# Patient Record
Sex: MALE | Race: WHITE | NOT HISPANIC OR LATINO | Employment: FULL TIME | URBAN - METROPOLITAN AREA
[De-identification: names, ages, dates, MRNs, and addresses within clinical notes are randomized per-mention and may not be internally consistent; named-entity substitution may affect disease eponyms.]

---

## 2017-03-11 ENCOUNTER — COMMUNICATION - HEALTHEAST (OUTPATIENT)
Dept: FAMILY MEDICINE | Facility: CLINIC | Age: 62
End: 2017-03-11

## 2017-09-02 ENCOUNTER — COMMUNICATION - HEALTHEAST (OUTPATIENT)
Dept: FAMILY MEDICINE | Facility: CLINIC | Age: 62
End: 2017-09-02

## 2017-09-02 DIAGNOSIS — I10 HTN (HYPERTENSION): ICD-10-CM

## 2017-10-06 ENCOUNTER — OFFICE VISIT - HEALTHEAST (OUTPATIENT)
Dept: FAMILY MEDICINE | Facility: CLINIC | Age: 62
End: 2017-10-06

## 2017-10-06 DIAGNOSIS — F41.9 ANXIETY: ICD-10-CM

## 2017-10-06 DIAGNOSIS — I10 HYPERTENSION: ICD-10-CM

## 2017-10-06 LAB
CHOLEST SERPL-MCNC: 157 MG/DL
FASTING STATUS PATIENT QL REPORTED: YES
HDLC SERPL-MCNC: 42 MG/DL
LDLC SERPL CALC-MCNC: 84 MG/DL
PSA SERPL-MCNC: 0.8 NG/ML (ref 0–4.5)
TRIGL SERPL-MCNC: 156 MG/DL

## 2017-10-06 ASSESSMENT — MIFFLIN-ST. JEOR: SCORE: 1787.36

## 2017-10-09 ENCOUNTER — COMMUNICATION - HEALTHEAST (OUTPATIENT)
Dept: FAMILY MEDICINE | Facility: CLINIC | Age: 62
End: 2017-10-09

## 2017-10-09 DIAGNOSIS — F39 MOOD DISORDER (H): ICD-10-CM

## 2017-10-09 DIAGNOSIS — I10 ESSENTIAL HYPERTENSION: ICD-10-CM

## 2017-11-04 ENCOUNTER — COMMUNICATION - HEALTHEAST (OUTPATIENT)
Dept: FAMILY MEDICINE | Facility: CLINIC | Age: 62
End: 2017-11-04

## 2017-11-04 DIAGNOSIS — I10 HYPERTENSION: ICD-10-CM

## 2018-01-09 ENCOUNTER — COMMUNICATION - HEALTHEAST (OUTPATIENT)
Dept: FAMILY MEDICINE | Facility: CLINIC | Age: 63
End: 2018-01-09

## 2018-05-12 ENCOUNTER — COMMUNICATION - HEALTHEAST (OUTPATIENT)
Dept: FAMILY MEDICINE | Facility: CLINIC | Age: 63
End: 2018-05-12

## 2018-05-12 DIAGNOSIS — I10 HYPERTENSION: ICD-10-CM

## 2018-07-31 ENCOUNTER — COMMUNICATION - HEALTHEAST (OUTPATIENT)
Dept: FAMILY MEDICINE | Facility: CLINIC | Age: 63
End: 2018-07-31

## 2018-07-31 DIAGNOSIS — I10 HYPERTENSION: ICD-10-CM

## 2018-11-02 ENCOUNTER — OFFICE VISIT - HEALTHEAST (OUTPATIENT)
Dept: FAMILY MEDICINE | Facility: CLINIC | Age: 63
End: 2018-11-02

## 2018-11-02 DIAGNOSIS — F39 MOOD DISORDER (H): ICD-10-CM

## 2018-11-02 DIAGNOSIS — Z12.5 SCREENING FOR PROSTATE CANCER: ICD-10-CM

## 2018-11-02 DIAGNOSIS — L98.9 SKIN LESIONS: ICD-10-CM

## 2018-11-02 DIAGNOSIS — I10 ESSENTIAL HYPERTENSION: ICD-10-CM

## 2018-11-02 DIAGNOSIS — Z23 NEED FOR IMMUNIZATION AGAINST INFLUENZA: ICD-10-CM

## 2018-11-02 DIAGNOSIS — I10 HYPERTENSION: ICD-10-CM

## 2018-11-02 LAB
ANION GAP SERPL CALCULATED.3IONS-SCNC: 12 MMOL/L (ref 5–18)
BUN SERPL-MCNC: 16 MG/DL (ref 8–22)
CALCIUM SERPL-MCNC: 10.1 MG/DL (ref 8.5–10.5)
CHLORIDE BLD-SCNC: 98 MMOL/L (ref 98–107)
CHOLEST SERPL-MCNC: 135 MG/DL
CO2 SERPL-SCNC: 28 MMOL/L (ref 22–31)
CREAT SERPL-MCNC: 0.84 MG/DL (ref 0.7–1.3)
FASTING STATUS PATIENT QL REPORTED: YES
GFR SERPL CREATININE-BSD FRML MDRD: >60 ML/MIN/1.73M2
GLUCOSE BLD-MCNC: 88 MG/DL (ref 70–125)
HDLC SERPL-MCNC: 44 MG/DL
LDLC SERPL CALC-MCNC: 70 MG/DL
POTASSIUM BLD-SCNC: 4.7 MMOL/L (ref 3.5–5)
PSA SERPL-MCNC: 0.7 NG/ML (ref 0–4.5)
SODIUM SERPL-SCNC: 138 MMOL/L (ref 136–145)
TRIGL SERPL-MCNC: 106 MG/DL

## 2018-11-05 ENCOUNTER — COMMUNICATION - HEALTHEAST (OUTPATIENT)
Dept: FAMILY MEDICINE | Facility: CLINIC | Age: 63
End: 2018-11-05

## 2018-11-05 DIAGNOSIS — F39 MOOD DISORDER (H): ICD-10-CM

## 2018-11-05 DIAGNOSIS — I10 HYPERTENSION: ICD-10-CM

## 2018-11-05 DIAGNOSIS — I10 ESSENTIAL HYPERTENSION: ICD-10-CM

## 2019-11-01 ENCOUNTER — OFFICE VISIT - HEALTHEAST (OUTPATIENT)
Dept: FAMILY MEDICINE | Facility: CLINIC | Age: 64
End: 2019-11-01

## 2019-11-01 DIAGNOSIS — I10 HYPERTENSION: ICD-10-CM

## 2019-11-01 DIAGNOSIS — F41.9 ANXIETY: ICD-10-CM

## 2019-11-01 DIAGNOSIS — Z12.5 SCREENING FOR PROSTATE CANCER: ICD-10-CM

## 2019-11-01 DIAGNOSIS — I10 ESSENTIAL HYPERTENSION: ICD-10-CM

## 2019-11-01 DIAGNOSIS — F39 MOOD DISORDER (H): ICD-10-CM

## 2019-11-01 LAB
ANION GAP SERPL CALCULATED.3IONS-SCNC: 10 MMOL/L (ref 5–18)
BUN SERPL-MCNC: 16 MG/DL (ref 8–22)
CALCIUM SERPL-MCNC: 9.9 MG/DL (ref 8.5–10.5)
CHLORIDE BLD-SCNC: 101 MMOL/L (ref 98–107)
CO2 SERPL-SCNC: 29 MMOL/L (ref 22–31)
CREAT SERPL-MCNC: 0.81 MG/DL (ref 0.7–1.3)
GFR SERPL CREATININE-BSD FRML MDRD: >60 ML/MIN/1.73M2
GLUCOSE BLD-MCNC: 84 MG/DL (ref 70–125)
POTASSIUM BLD-SCNC: 3.8 MMOL/L (ref 3.5–5)
PSA SERPL-MCNC: 0.8 NG/ML (ref 0–4.5)
SODIUM SERPL-SCNC: 140 MMOL/L (ref 136–145)

## 2019-11-01 ASSESSMENT — PATIENT HEALTH QUESTIONNAIRE - PHQ9: SUM OF ALL RESPONSES TO PHQ QUESTIONS 1-9: 2

## 2019-11-01 ASSESSMENT — ANXIETY QUESTIONNAIRES
3. WORRYING TOO MUCH ABOUT DIFFERENT THINGS: NOT AT ALL
5. BEING SO RESTLESS THAT IT IS HARD TO SIT STILL: NOT AT ALL
IF YOU CHECKED OFF ANY PROBLEMS ON THIS QUESTIONNAIRE, HOW DIFFICULT HAVE THESE PROBLEMS MADE IT FOR YOU TO DO YOUR WORK, TAKE CARE OF THINGS AT HOME, OR GET ALONG WITH OTHER PEOPLE: NOT DIFFICULT AT ALL
GAD7 TOTAL SCORE: 1
1. FEELING NERVOUS, ANXIOUS, OR ON EDGE: NOT AT ALL
7. FEELING AFRAID AS IF SOMETHING AWFUL MIGHT HAPPEN: NOT AT ALL
4. TROUBLE RELAXING: NOT AT ALL
2. NOT BEING ABLE TO STOP OR CONTROL WORRYING: NOT AT ALL
6. BECOMING EASILY ANNOYED OR IRRITABLE: SEVERAL DAYS

## 2019-11-01 ASSESSMENT — MIFFLIN-ST. JEOR: SCORE: 1838.62

## 2020-09-28 ENCOUNTER — COMMUNICATION - HEALTHEAST (OUTPATIENT)
Dept: FAMILY MEDICINE | Facility: CLINIC | Age: 65
End: 2020-09-28

## 2020-09-28 DIAGNOSIS — F39 MOOD DISORDER (H): ICD-10-CM

## 2020-10-23 ENCOUNTER — OFFICE VISIT - HEALTHEAST (OUTPATIENT)
Dept: FAMILY MEDICINE | Facility: CLINIC | Age: 65
End: 2020-10-23

## 2020-10-23 DIAGNOSIS — Z12.5 SCREENING FOR PROSTATE CANCER: ICD-10-CM

## 2020-10-23 DIAGNOSIS — Z11.59 NEED FOR HEPATITIS C SCREENING TEST: ICD-10-CM

## 2020-10-23 DIAGNOSIS — I10 HYPERTENSION: ICD-10-CM

## 2020-10-23 DIAGNOSIS — F39 MOOD DISORDER (H): ICD-10-CM

## 2020-10-23 DIAGNOSIS — Z11.4 ENCOUNTER FOR SCREENING FOR HIV: ICD-10-CM

## 2020-10-23 DIAGNOSIS — I10 ESSENTIAL HYPERTENSION: ICD-10-CM

## 2020-10-23 LAB
ANION GAP SERPL CALCULATED.3IONS-SCNC: 13 MMOL/L (ref 5–18)
BUN SERPL-MCNC: 16 MG/DL (ref 8–22)
CALCIUM SERPL-MCNC: 10.1 MG/DL (ref 8.5–10.5)
CHLORIDE BLD-SCNC: 99 MMOL/L (ref 98–107)
CHOLEST SERPL-MCNC: 168 MG/DL
CO2 SERPL-SCNC: 29 MMOL/L (ref 22–31)
CREAT SERPL-MCNC: 0.83 MG/DL (ref 0.7–1.3)
ERYTHROCYTE [DISTWIDTH] IN BLOOD BY AUTOMATED COUNT: 11.4 % (ref 11–14.5)
FASTING STATUS PATIENT QL REPORTED: YES
GFR SERPL CREATININE-BSD FRML MDRD: >60 ML/MIN/1.73M2
GLUCOSE BLD-MCNC: 86 MG/DL (ref 70–125)
HCT VFR BLD AUTO: 50 % (ref 40–54)
HDLC SERPL-MCNC: 53 MG/DL
HGB BLD-MCNC: 17 G/DL (ref 14–18)
HIV 1+2 AB+HIV1 P24 AG SERPL QL IA: NEGATIVE
LDLC SERPL CALC-MCNC: 85 MG/DL
MCH RBC QN AUTO: 32.2 PG (ref 27–34)
MCHC RBC AUTO-ENTMCNC: 34 G/DL (ref 32–36)
MCV RBC AUTO: 95 FL (ref 80–100)
PLATELET # BLD AUTO: 180 THOU/UL (ref 140–440)
PMV BLD AUTO: 7 FL (ref 7–10)
POTASSIUM BLD-SCNC: 4.6 MMOL/L (ref 3.5–5)
PSA SERPL-MCNC: 0.9 NG/ML (ref 0–4.5)
RBC # BLD AUTO: 5.29 MILL/UL (ref 4.4–6.2)
SODIUM SERPL-SCNC: 141 MMOL/L (ref 136–145)
TRIGL SERPL-MCNC: 152 MG/DL
WBC: 7 THOU/UL (ref 4–11)

## 2020-10-23 RX ORDER — METOPROLOL SUCCINATE 25 MG/1
25 TABLET, EXTENDED RELEASE ORAL DAILY
Qty: 30 TABLET | Refills: 11 | Status: SHIPPED | OUTPATIENT
Start: 2020-10-23 | End: 2021-10-17

## 2020-10-23 RX ORDER — LISINOPRIL AND HYDROCHLOROTHIAZIDE 20; 25 MG/1; MG/1
1 TABLET ORAL DAILY
Qty: 30 TABLET | Refills: 11 | Status: SHIPPED | OUTPATIENT
Start: 2020-10-23 | End: 2021-10-17

## 2020-10-23 RX ORDER — SERTRALINE HYDROCHLORIDE 100 MG/1
TABLET, FILM COATED ORAL
Qty: 45 TABLET | Refills: 11 | Status: SHIPPED | OUTPATIENT
Start: 2020-10-23 | End: 2021-10-17

## 2020-10-23 ASSESSMENT — ANXIETY QUESTIONNAIRES
5. BEING SO RESTLESS THAT IT IS HARD TO SIT STILL: NOT AT ALL
7. FEELING AFRAID AS IF SOMETHING AWFUL MIGHT HAPPEN: NOT AT ALL
1. FEELING NERVOUS, ANXIOUS, OR ON EDGE: SEVERAL DAYS
6. BECOMING EASILY ANNOYED OR IRRITABLE: SEVERAL DAYS
6. BECOMING EASILY ANNOYED OR IRRITABLE: SEVERAL DAYS
2. NOT BEING ABLE TO STOP OR CONTROL WORRYING: NOT AT ALL
1. FEELING NERVOUS, ANXIOUS, OR ON EDGE: SEVERAL DAYS
GAD7 TOTAL SCORE: 2
2. NOT BEING ABLE TO STOP OR CONTROL WORRYING: NOT AT ALL
4. TROUBLE RELAXING: NOT AT ALL
3. WORRYING TOO MUCH ABOUT DIFFERENT THINGS: NOT AT ALL
5. BEING SO RESTLESS THAT IT IS HARD TO SIT STILL: NOT AT ALL
4. TROUBLE RELAXING: NOT AT ALL
GAD7 TOTAL SCORE: 2
IF YOU CHECKED OFF ANY PROBLEMS ON THIS QUESTIONNAIRE, HOW DIFFICULT HAVE THESE PROBLEMS MADE IT FOR YOU TO DO YOUR WORK, TAKE CARE OF THINGS AT HOME, OR GET ALONG WITH OTHER PEOPLE: NOT DIFFICULT AT ALL
7. FEELING AFRAID AS IF SOMETHING AWFUL MIGHT HAPPEN: NOT AT ALL
3. WORRYING TOO MUCH ABOUT DIFFERENT THINGS: NOT AT ALL

## 2020-10-23 ASSESSMENT — PATIENT HEALTH QUESTIONNAIRE - PHQ9: SUM OF ALL RESPONSES TO PHQ QUESTIONS 1-9: 2

## 2020-10-26 LAB — HCV AB SERPL QL IA: NEGATIVE

## 2021-05-26 ASSESSMENT — PATIENT HEALTH QUESTIONNAIRE - PHQ9
SUM OF ALL RESPONSES TO PHQ QUESTIONS 1-9: 2
SUM OF ALL RESPONSES TO PHQ QUESTIONS 1-9: 2

## 2021-05-28 ASSESSMENT — ANXIETY QUESTIONNAIRES
GAD7 TOTAL SCORE: 1
GAD7 TOTAL SCORE: 2

## 2021-05-31 VITALS — HEIGHT: 71 IN | WEIGHT: 215 LBS | BODY MASS INDEX: 30.1 KG/M2

## 2021-06-02 VITALS — BODY MASS INDEX: 30.93 KG/M2 | WEIGHT: 221.8 LBS

## 2021-06-02 NOTE — PATIENT INSTRUCTIONS - HE
You received the flu shot   I recommend that you consider the Shingles vaccine/shingrix  We will check your laboratory testing as discussed

## 2021-06-02 NOTE — PROGRESS NOTES
Assessment/ Plan     1. Essential hypertension  Currently stable but borderline control    Continue lisinopril-hydrochlorothiazide and metoprolol  Have reviewed side effects  Check a basic metabolic panel  Recommend increasing aerobic exercise  Monitor blood pressure  - Basic Metabolic Panel    - lisinopril-hydrochlorothiazide (PRINZIDE,ZESTORETIC) 20-25 mg per tablet; Take 1 tablet by mouth daily.  Dispense: 30 tablet; Refill: 11    - metoprolol succinate (TOPROL-XL) 25 MG; Take 1 tablet (25 mg total) by mouth daily.  Dispense: 30 tablet; Refill: 11      2. Anxiety  Mood disorder with associated depression  Currently stable    He is ELMER-7 score is 1  His PHQ-9 score is 2  Patient preference is to stay on sertraline 150 mg daily  He prefers not to consider weaning the medication at this time as he is doing so well    - sertraline (ZOLOFT) 100 MG tablet; TAKE 1 AND 1/2 TABLETS(150 MG) BY MOUTH DAILY  Dispense: 45 tablet; Refill: 11      3. Screening for prostate cancer    Check a PSA  - PSA (Prostatic-Specific Antigen), Annual Screen      Subjective:       Jaime Kaufman is a 64 y.o. male who presents to the clinic for a medication check.  His medical history is notable for hypertension as well as a mood disorder with associated depression and anxiety.  For hypertension he continues to take lisinopril-hydrocorothiazide and metoprolol.  He tolerates his medication without difficulty and his blood pressure is generally been stable.    He reports his mood is been quite stable as well.  He continues to take sertraline 150 mg daily.  He completed a ELMER-7 questionnaire with a total of one point.  He can be somewhat irritable but denies significant anxiety.  His mood has been stable and he denies depression symptoms.  He can have some sleep and concentration issues but this has not been significant.  His preference is to not wean off of sertraline.    Interim history is notable for the fact that he did have a tick bite  "that became infected.  Initially he was treated with cephalexin but then switched to doxycycline given the possibility of a Lyme infection.  He denies joint pain or flulike illness.    He tolerates physical exertion without difficulty.  Denies chest pain, shortness of breath, or palpitations.  He would like a flu vaccine.  He will consider the shingles vaccine.    His colonoscopy is up-to-date from 2011.  He is due in 2021.    The following portions of the patient's history were reviewed and updated as appropriate: allergies, current medications, past family history, past medical history, past social history, past surgical history and problem list. Medications have been reconciled    Review of Systems   A 12 point comprehensive review of systems was negative except as noted.      Current Outpatient Medications   Medication Sig Dispense Refill     lisinopril-hydrochlorothiazide (PRINZIDE,ZESTORETIC) 20-25 mg per tablet Take 1 tablet by mouth daily. 30 tablet 11     metoprolol succinate (TOPROL-XL) 25 MG Take 1 tablet (25 mg total) by mouth daily. 30 tablet 11     sertraline (ZOLOFT) 100 MG tablet TAKE 1 AND 1/2 TABLETS(150 MG) BY MOUTH DAILY 45 tablet 11     No current facility-administered medications for this visit.        Objective:      /82   Pulse 72   Resp 20   Ht 5' 11\" (1.803 m)   Wt (!) 226 lb 4.8 oz (102.6 kg)   BMI 31.56 kg/m        General appearance: alert, appears stated age and cooperative  Head: Normocephalic, without obvious abnormality, atraumatic  Eyes: conjunctivae/corneas clear. PERRL, EOM's intact.   Lungs: clear to auscultation bilaterally  Heart: regular rate and rhythm, S1, S2 normal, no murmur, click, rub or gallop  Extremities: extremities normal, atraumatic, no cyanosis or edema  Skin: Skin color, texture, turgor normal. No rashes or lesions  Lymph nodes: Cervical nodes normal.  Neurologic: Alert and oriented X 3         No results found for this or any previous visit (from the " past 168 hour(s)).       This note has been dictated using voice recognition software. Any grammatical or context distortions are unintentional and inherent to the software

## 2021-06-03 VITALS
HEIGHT: 71 IN | BODY MASS INDEX: 31.68 KG/M2 | SYSTOLIC BLOOD PRESSURE: 136 MMHG | DIASTOLIC BLOOD PRESSURE: 82 MMHG | WEIGHT: 226.3 LBS | RESPIRATION RATE: 20 BRPM | HEART RATE: 72 BPM

## 2021-06-05 VITALS
BODY MASS INDEX: 30.24 KG/M2 | HEART RATE: 93 BPM | TEMPERATURE: 98.6 F | DIASTOLIC BLOOD PRESSURE: 97 MMHG | WEIGHT: 216.8 LBS | SYSTOLIC BLOOD PRESSURE: 152 MMHG | RESPIRATION RATE: 16 BRPM

## 2021-06-12 NOTE — PROGRESS NOTES
Assessment/ Plan     1. Mood disorder (H)  Anxiety    PHQ-9 score is 2  ELMER-7 score is 2  Continue sertraline 150 mg daily  This medication has been effective for him and he prefers not to wean this medication.  He would like to continue the current dose    Check laboratory testing as noted  - sertraline (ZOLOFT) 100 MG tablet; TAKE ONE AND ONE-HALF TABLETS BY MOUTH ONCE DAILY  Dispense: 45 tablet; Refill: 11  - HM2(CBC w/o Differential)    2. Essential hypertension  3. Hypertension    Inadequate control.  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  Recommend increasing metoprolol if his blood pressure remains elevated  Reviewed potential side effects of medication and potential risks of elevated blood pressure    - lisinopriL-hydrochlorothiazide (PRINZIDE,ZESTORETIC) 20-25 mg per tablet; Take 1 tablet by mouth daily.  Dispense: 30 tablet; Refill: 11  - Basic Metabolic Panel  - Lipid Cascade    - metoprolol succinate (TOPROL-XL) 25 MG; Take 1 tablet (25 mg total) by mouth daily.  Dispense: 30 tablet; Refill: 11    4. Screening for prostate cancer    Check a PSA  - PSA (Prostatic-Specific Antigen), Annual Screen    5. Encounter for screening for HIV    Check an HIV test  - HIV Antigen/Antibody Screening [place]    6. Need for hepatitis C screening test    We will check a hepatitis C test  - Hepatitis C Antibody (Anti-HCV)      Subjective:       Jaime Kaufman is a 65 y.o. male who presents for a medication check.    His medical history is notable for a mood disorder associated with anxiety as well as hypertension.  He continues to take sertraline 150 mg daily.  He completed a ELMER-7 questionnaire times where he can worry about some things.  He can be irritable  at times as well.  His mood has generally been stable for denies significant depression symptoms.  He would like to continue sertraline 150 mg a day as this has been effective for him.  This has helped with some of his social anxiety.    For blood pressure he continues to take lisinopril-hydrochlorothiazide and Metroprolol and reports that his blood pressure was normal with his recent DOT physical examination of his blood pressure is normal today.  He has not had any symptoms of concern.  He denies headache, dizziness, chest pain with exertion, shortness of breath, or other concerns.    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  He has been able to stay healthy.  He denies other concerns at this time.    The following portions of the patient's history were reviewed and updated as appropriate: allergies, current medications, past family history, past medical history, past social history, past surgical history and problem list. Medications have been reconciled    Review of Systems   A 12 point comprehensive review of systems was negative except as noted.      Current Outpatient Medications   Medication Sig Dispense Refill     lisinopriL-hydrochlorothiazide (PRINZIDE,ZESTORETIC) 20-25 mg per tablet Take 1 tablet by mouth daily. 30 tablet 11     metoprolol succinate (TOPROL-XL) 25 MG Take 1 tablet (25 mg total) by mouth daily. 30 tablet 11     sertraline (ZOLOFT) 100 MG tablet TAKE ONE AND ONE-HALF TABLETS BY MOUTH ONCE DAILY 45 tablet 11     No current facility-administered medications for this visit.        Objective:      BP (!) 152/97   Pulse 93   Temp 98.6  F (37  C) (Oral)   Resp 16   Wt 216 lb 12.8 oz (98.3 kg)   BMI 30.24 kg/m      General appearance: alert, appears stated age   Head: normocephalic, without obvious abnormality, atraumatic  Eyes: conjunctivae/corneas clear. PERRL, EOM's intact.   Lungs: clear to auscultation bilaterally  Heart: regular  rate and rhythm, S1, S2 normal, no murmur, click, rub or gallop  Extremities: extremities normal, atraumatic, no cyanosis or edema  Skin: skin color, texture, turgor normal  Lymph nodes: Cervical nodes normal.  Neurologic: Alert and oriented X 3           No results found for this or any previous visit (from the past 168 hour(s)).       This note has been dictated using voice recognition software. Any grammatical or context distortions are unintentional and inherent to the software    Krishna Stout MD

## 2021-06-12 NOTE — PATIENT INSTRUCTIONS - HE
Continue current medications and monitor your blood pressure  Your goal blood pressure is less than 120/80  We will check laboratory testing as discussed  You may consider the Shingrix/shingles vaccine

## 2021-06-13 NOTE — PROGRESS NOTES
Assessment/ Plan     1. Hypertension    Continue metoprolol and lisinopril-hydrochlorthiazide  Reviewed potential side effects  Monitor blood pressure    Check laboratory testing  - Lipid Cascade  - Basic Metabolic Panel    2. Anxiety    A PHQ-9 score is 1  A ELMER-7 score is 1    Continue sertraline 150 mg daily  Review potential side effects  Patient does not have an interest or desire in weaning his medication at this time    Healthcare maintenance    Patient received influenza vaccine  Reviewed prostate cancer screening and discussed caveats  - PSA, Annual Screen (Prostatic-Specific Antigen)      Subjective:       Jaime Kaufman is a 62 y.o. male who presents to the clinic for a medication check.  He has known history of hypertension as well as mood disorder with depression and anxiety.  His medical history is notable for hypertension which has been well controlled with atenolol and lisinopril-hydrochlorothiazide.  He also has a history of an adjustment disorder with depressed mood as well as anxiety.  At the last visit he reported that his symptoms can wax and wane.  He noted that he sometimes will have little interest or pleasure in doing things and will feel down and depressed.    In the past he has been more anxious and irritable.  He now feels more stable on his medication.  He continues take sertraline 150 mg a day.  He sometimes feels tired but in general feels that his mood has been very good.  He has not had significant anxiety and has not been worrying.  He does become easily easily annoyed at times.  His last total cholesterol is 147 with an LDL of 74.  His review of systems negative for headache, dizziness, chest pain, palpitations, or other specific concerns.    The following portions of the patient's history were reviewed and updated as appropriate: allergies, current medications, past family history, past medical history, past social history, past surgical history and problem list.    Review of  "Systems   A 12 point comprehensive review of systems was negative except as noted.      Current Outpatient Prescriptions   Medication Sig Dispense Refill     metoprolol succinate (TOPROL XL) 25 MG Take 1 tablet (25 mg total) by mouth daily. 30 tablet 2     lisinopril-hydrochlorothiazide (PRINZIDE,ZESTORETIC) 20-25 mg per tablet Take 1 tablet by mouth daily. 90 tablet 3     sertraline (ZOLOFT) 100 MG tablet Take 1.5 tablets (150 mg total) by mouth daily. 135 tablet 3     No current facility-administered medications for this visit.        Objective:      /80  Pulse 80  Temp 97.5  F (36.4  C) (Oral)   Resp 16  Ht 5' 11\" (1.803 m)  Wt 215 lb (97.5 kg)  BMI 29.99 kg/m2      General appearance: alert, appears stated age and cooperative  Head: Normocephalic, without obvious abnormality, atraumatic  Eyes: conjunctivae/corneas clear. PERRL, EOM's intact. Neck: no adenopathy  Lungs: clear to auscultation bilaterally  Heart: regular rate and rhythm, S1, S2 normal, no murmur, click, rub or gallop  Lymph nodes: Cervical nodes normal.  Extremities without cyanosis or edema  Neurologic: Alert and oriented X 3. Normal coordination and gait         No results found for this or any previous visit (from the past 168 hour(s)).       This note has been dictated using voice recognition software. Any grammatical or context distortions are unintentional and inherent to the software  "

## 2021-06-21 NOTE — PROGRESS NOTES
Assessment/ Plan     1. Hypertension, well controlled    Continue metoprolol 25 mg daily  Continue lisinopril and hydrochlorothiazide 20/25 mg daily  Review potential side effects  Check a basic metabolic panel  - Basic Metabolic Panel  Recommend that he remain physically active and work on diet    2. Mood disorder (H)  Symptoms of anxiety and depression  Currently stable    The PHQ-9 score is 2  A ELMER-7 score is 2  Continue sertraline 150 mg daily  Patient has tolerated this medication and has no desire to wean this medication    3. Need for immunization against influenza  Influenza vaccine was given  - Influenza, Recombinant, Inj, Quadrivalent, PF, 18+YRS    5. Screening for prostate cancer  Check a PSA  Discussed the caveats of testing    6.  Skin lesions, face and forehead  He likely has an actinic keratosis in the left forehead    Recommend follow-up with dermatology for a full skin check    Healthcare maintenance    Recommend the Shingrix/shingles vaccine  His colonoscopy is up-to-date from August 2011.  He is due in August of 2021  - PSA (Prostatic-Specific Antigen), Annual Screen      Subjective:       Jaime Kaufman is a 63 y.o. male who presents to the clinic for a medication check.  He has a history of a mood disorder associated with depression and anxiety as well as hypertension.  He continues to take sertraline 150 mg a day.  This medication has worked quite well for him and he has no desire to wean his medication at this time.  He did complete a ELMER-7 questionnaire and scored a total of 2 points.  He notes that he sometimes will have some difficulty with relaxing and can be restless at times.  His mood has generally been good.  He denies any feelings of depression or hopelessness.  He denies loss of interest or pleasure in doing things.  He sometimes has some difficulty with sleep.  He would like to continue sertraline at the current dose.    Additionally, he has a history of hypertension which has  been stable.  He continues to take metoprolol as well as lisinopril-hydrochlorothiazide.  He denies any significant side effects including lightheadedness and cough.  He did have laboratory testing one year ago.  At that time his total cholesterol is 157 with triglycerides of 156.  His LDL was 84.    He is here for laboratory testing.  Review of systems is generally unremarkable.  He denies concerns with dizziness, chest pain, shortness of breath with exertion, bowel changes, or any urinary concerns.    He is up-to-date from his colonoscopy which was done in August 2011.  This showed diverticulosis but was otherwise normal.    He does have one concern regarding his skin lesion in the left temple area.  There is a brownish lesion with some discoloration surrounding this.  He has had a lot of sun exposure over time as he does a lot of outdoor activities such as fishing.    The following portions of the patient's history were reviewed and updated as appropriate: allergies, current medications, past family history, past medical history, past social history, past surgical history and problem list.    Review of Systems   A 12 point comprehensive review of systems was negative except as noted.      Current Outpatient Prescriptions   Medication Sig Dispense Refill     lisinopril-hydrochlorothiazide (PRINZIDE,ZESTORETIC) 20-25 mg per tablet Take 1 tablet by mouth daily. 90 tablet 3     metoprolol succinate (TOPROL-XL) 25 MG TAKE 1 TABLET(25 MG) BY MOUTH DAILY 90 tablet 0     sertraline (ZOLOFT) 100 MG tablet Take 1.5 tablets (150 mg total) by mouth daily. 135 tablet 3     No current facility-administered medications for this visit.        Objective:      /62  Pulse 76  Wt 221 lb 12.8 oz (100.6 kg)  BMI 30.93 kg/m2      General appearance: alert, appears stated age and cooperative  Head: Normocephalic, without obvious abnormality, atraumatic  Eyes: conjunctivae/corneas clear. PERRL, EOM's intact.   Nose: Nares  normal. Septum midline. Mucosa normal. No drainage or sinus tenderness.  Throat: lips, mucosa, and tongue normal; teeth and gums normal  Neck: no adenopathy, supple, symmetrical, trachea midline   Back: symmetric, no curvature. ROM normal. No CVA tenderness.  Lungs: clear to auscultation bilaterally  Heart: regular rate and rhythm, S1, S2 normal, no murmur, click, rub or gallop  Extremities: extremities normal, atraumatic, no cyanosis or edema  Skin: Examination of the forehead and face reveals multiple mildly erythematous maculopapular lesions  There is a left temple lesion is brownish in coloration with some surrounding erythema and increased vasculature noted.  There is a texture change as well  Lymph nodes: Cervical nodes normal.  Neurologic: Alert and oriented X 3.         No results found for this or any previous visit (from the past 168 hour(s)).       This note has been dictated using voice recognition software. Any grammatical or context distortions are unintentional and inherent to the software

## 2021-06-27 ENCOUNTER — HEALTH MAINTENANCE LETTER (OUTPATIENT)
Age: 66
End: 2021-06-27

## 2021-10-16 ENCOUNTER — HEALTH MAINTENANCE LETTER (OUTPATIENT)
Age: 66
End: 2021-10-16

## 2021-11-29 ENCOUNTER — OFFICE VISIT (OUTPATIENT)
Dept: FAMILY MEDICINE | Facility: CLINIC | Age: 66
End: 2021-11-29
Payer: COMMERCIAL

## 2021-11-29 VITALS
HEART RATE: 75 BPM | DIASTOLIC BLOOD PRESSURE: 85 MMHG | HEIGHT: 71 IN | WEIGHT: 221.6 LBS | RESPIRATION RATE: 16 BRPM | BODY MASS INDEX: 31.02 KG/M2 | SYSTOLIC BLOOD PRESSURE: 154 MMHG | TEMPERATURE: 98.9 F

## 2021-11-29 DIAGNOSIS — F39 MOOD DISORDER (H): ICD-10-CM

## 2021-11-29 DIAGNOSIS — I10 ESSENTIAL HYPERTENSION: ICD-10-CM

## 2021-11-29 LAB
ANION GAP SERPL CALCULATED.3IONS-SCNC: 10 MMOL/L (ref 5–18)
BUN SERPL-MCNC: 19 MG/DL (ref 8–22)
CALCIUM SERPL-MCNC: 10.2 MG/DL (ref 8.5–10.5)
CHLORIDE BLD-SCNC: 99 MMOL/L (ref 98–107)
CHOLEST SERPL-MCNC: 184 MG/DL
CO2 SERPL-SCNC: 32 MMOL/L (ref 22–31)
CREAT SERPL-MCNC: 0.83 MG/DL (ref 0.7–1.3)
FASTING STATUS PATIENT QL REPORTED: YES
GFR SERPL CREATININE-BSD FRML MDRD: >90 ML/MIN/1.73M2
GLUCOSE BLD-MCNC: 86 MG/DL (ref 70–125)
HDLC SERPL-MCNC: 61 MG/DL
LDLC SERPL CALC-MCNC: 101 MG/DL
POTASSIUM BLD-SCNC: 4.6 MMOL/L (ref 3.5–5)
SODIUM SERPL-SCNC: 141 MMOL/L (ref 136–145)
TRIGL SERPL-MCNC: 110 MG/DL

## 2021-11-29 PROCEDURE — 80061 LIPID PANEL: CPT | Performed by: FAMILY MEDICINE

## 2021-11-29 PROCEDURE — 36415 COLL VENOUS BLD VENIPUNCTURE: CPT | Performed by: FAMILY MEDICINE

## 2021-11-29 PROCEDURE — 90471 IMMUNIZATION ADMIN: CPT | Performed by: FAMILY MEDICINE

## 2021-11-29 PROCEDURE — 0004A COVID-19,PF,PFIZER (12+ YRS): CPT | Performed by: FAMILY MEDICINE

## 2021-11-29 PROCEDURE — 91300 COVID-19,PF,PFIZER (12+ YRS): CPT | Performed by: FAMILY MEDICINE

## 2021-11-29 PROCEDURE — 90682 RIV4 VACC RECOMBINANT DNA IM: CPT | Performed by: FAMILY MEDICINE

## 2021-11-29 PROCEDURE — 99214 OFFICE O/P EST MOD 30 MIN: CPT | Mod: 25 | Performed by: FAMILY MEDICINE

## 2021-11-29 PROCEDURE — 80048 BASIC METABOLIC PNL TOTAL CA: CPT | Performed by: FAMILY MEDICINE

## 2021-11-29 RX ORDER — METOPROLOL SUCCINATE 50 MG/1
50 TABLET, EXTENDED RELEASE ORAL DAILY
Qty: 30 TABLET | Refills: 11 | Status: SHIPPED | OUTPATIENT
Start: 2021-11-29 | End: 2022-11-07

## 2021-11-29 RX ORDER — METOPROLOL SUCCINATE 25 MG/1
25 TABLET, EXTENDED RELEASE ORAL DAILY
Qty: 30 TABLET | Refills: 1 | Status: CANCELLED | OUTPATIENT
Start: 2021-11-29

## 2021-11-29 RX ORDER — SERTRALINE HYDROCHLORIDE 100 MG/1
TABLET, FILM COATED ORAL
Qty: 30 TABLET | Refills: 11 | Status: SHIPPED | OUTPATIENT
Start: 2021-11-29 | End: 2022-11-30

## 2021-11-29 RX ORDER — LISINOPRIL AND HYDROCHLOROTHIAZIDE 20; 25 MG/1; MG/1
1 TABLET ORAL DAILY
Qty: 30 TABLET | Refills: 11 | Status: SHIPPED | OUTPATIENT
Start: 2021-11-29 | End: 2022-11-07

## 2021-11-29 ASSESSMENT — MIFFLIN-ST. JEOR: SCORE: 1817.3

## 2021-11-29 ASSESSMENT — ANXIETY QUESTIONNAIRES
1. FEELING NERVOUS, ANXIOUS, OR ON EDGE: SEVERAL DAYS
IF YOU CHECKED OFF ANY PROBLEMS ON THIS QUESTIONNAIRE, HOW DIFFICULT HAVE THESE PROBLEMS MADE IT FOR YOU TO DO YOUR WORK, TAKE CARE OF THINGS AT HOME, OR GET ALONG WITH OTHER PEOPLE: NOT DIFFICULT AT ALL
2. NOT BEING ABLE TO STOP OR CONTROL WORRYING: NOT AT ALL
7. FEELING AFRAID AS IF SOMETHING AWFUL MIGHT HAPPEN: NOT AT ALL
GAD7 TOTAL SCORE: 2
5. BEING SO RESTLESS THAT IT IS HARD TO SIT STILL: NOT AT ALL
6. BECOMING EASILY ANNOYED OR IRRITABLE: NOT AT ALL
3. WORRYING TOO MUCH ABOUT DIFFERENT THINGS: SEVERAL DAYS

## 2021-11-29 ASSESSMENT — PATIENT HEALTH QUESTIONNAIRE - PHQ9: 5. POOR APPETITE OR OVEREATING: NOT AT ALL

## 2021-11-29 NOTE — PATIENT INSTRUCTIONS
Artemio,    It was great to see you  Increase metoprolol to 50 mg a day as we discussed  Continue lisinopril-hydrochlorothiazide 20--25 mg daily  Continue to monitor your blood pressure.    You received the Covid-19 and flu shots today  We will check laboratory testing including your cholesterol and kidney profile with the blood sugar check  Please let me know if you want to do the colonoscopy starting in January  We can send a 90-day supply if your insurance will cover it as well    Krishna Stout MD

## 2021-11-30 ASSESSMENT — ANXIETY QUESTIONNAIRES: GAD7 TOTAL SCORE: 2

## 2021-11-30 ASSESSMENT — PATIENT HEALTH QUESTIONNAIRE - PHQ9: SUM OF ALL RESPONSES TO PHQ QUESTIONS 1-9: 4

## 2021-11-30 NOTE — PROGRESS NOTES
Assessment/ Plan     1. Essential hypertension  Inadequate control    Recommend increasing metoprolol to 50 mg daily  Continue lisinopril-hydrochlorothiazide 20-25 mg daily  Reviewed potential side effects  Check laboratory testing  Recommend working on dietary lifestyle changes  Follow to recheck blood pressure  Consider further adjustments as needed    - lisinopril-hydrochlorothiazide (ZESTORETIC) 20-25 MG tablet; Take 1 tablet by mouth daily  Dispense: 30 tablet; Refill: 11  - Lipid panel reflex to direct LDL Fasting; Future  - Basic metabolic panel; Future  - metoprolol succinate ER (TOPROL-XL) 50 MG 24 hr tablet; Take 1 tablet (50 mg) by mouth daily  Dispense: 30 tablet; Refill: 11  - Lipid panel reflex to direct LDL Fasting  - Basic metabolic panel    2. Mood disorder (H)  Currently stable    ELMER-7 score is 2  PHQ-9 score is 3  He has weaned sertraline to 100 mg daily  Continue with sertraline and follow-up as needed    - sertraline (ZOLOFT) 100 MG tablet; TAKE 1 PO Qday  Dispense: 30 tablet; Refill: 11    Healthcare maintenance    Vaccines given today include the Covid-19 booster and the influenza vaccine  His colonoscopy is due.  He prefers to wait until after January 1 when his insurance will change  Encourage a complete physical examination at that time    Spent 30 minutes including time for chart preparation as well as time with patient and reviewing medications and reviewing plan      Subjective:      Jaime Kaufman is a 66 year old male who presents for a medication check.  He has a known history of hypertension as well as a mood disorder associated with significant anxiety.    His medical history is notable for a mood disorder associated with anxiety as well as hypertension.  He continues to take sertraline and decided to wean that to 100 mg a day.  He completed a ELMER-7 questionnaire times where he can worry about some things.  He can be irritable at times as well.  His mood has generally been  "stable. He denies significant depression symptoms.       For blood pressure he continues to take lisinopril-hydrochlorothiazide and Metroprolol. His blood pressure is elevated today.  He has not had any symptoms of concern.  He denies headache, dizziness, chest pain with exertion, shortness of breath, or other concerns.     He continues to work for the [employer] at a [employer] and now is coordinating staffing for buses.    His last cholesterol is 168 with triglycerides of 152 and LDL of 85.    The following portions of the patient's history were reviewed and updated as appropriate: allergies, current medications, past family history, past medical history, past social history, past surgical history and problem list. Medications have been reconciled    Review of Systems   A 12 point comprehensive review of systems was negative except as noted.      Current Outpatient Medications   Medication Sig Dispense Refill     lisinopril-hydrochlorothiazide (ZESTORETIC) 20-25 MG tablet Take 1 tablet by mouth daily 30 tablet 11     metoprolol succinate ER (TOPROL-XL) 25 MG 24 hr tablet TAKE 1 TABLET(25 MG) BY MOUTH DAILY 30 tablet 1     metoprolol succinate ER (TOPROL-XL) 50 MG 24 hr tablet Take 1 tablet (50 mg) by mouth daily 30 tablet 11     sertraline (ZOLOFT) 100 MG tablet TAKE 1 PO Qday 30 tablet 11       Objective:     BP (!) 154/85 (BP Location: Left arm, Patient Position: Sitting, Cuff Size: Adult Regular)   Pulse 75   Temp 98.9  F (37.2  C) (Oral)   Resp 16   Ht 1.803 m (5' 11\")   Wt 100.5 kg (221 lb 9.6 oz)   BMI 30.91 kg/m      General appearance: alert, appears stated age   Head: normocephalic, without obvious abnormality, atraumatic  Eyes: conjunctivae/corneas clear. PERRL, EOM's intact.   Lungs: clear to auscultation bilaterally  Heart: regular rate and rhythm, S1, S2 normal, no murmur, click, rub or gallop  Abdomen: soft, non-tender; no masses,  no organomegaly  Extremities: extremities normal, atraumatic, " no cyanosis or edema  Skin: skin color, texture, turgor normal  Lymph nodes: Cervical nodes normal.  Neurologic: Alert and oriented X 3           Recent Results (from the past 168 hour(s))   Lipid panel reflex to direct LDL Fasting   Result Value Ref Range    Cholesterol 184 <=199 mg/dL    Triglycerides 110 <=149 mg/dL    Direct Measure HDL 61 >=40 mg/dL    LDL Cholesterol Calculated 101 <=129 mg/dL    Patient Fasting > 8hrs? Yes    Basic metabolic panel   Result Value Ref Range    Sodium 141 136 - 145 mmol/L    Potassium 4.6 3.5 - 5.0 mmol/L    Chloride 99 98 - 107 mmol/L    Carbon Dioxide (CO2) 32 (H) 22 - 31 mmol/L    Anion Gap 10 5 - 18 mmol/L    Urea Nitrogen 19 8 - 22 mg/dL    Creatinine 0.83 0.70 - 1.30 mg/dL    Calcium 10.2 8.5 - 10.5 mg/dL    Glucose 86 70 - 125 mg/dL    GFR Estimate >90 >60 mL/min/1.73m2          This note has been dictated using voice recognition software. Any grammatical or context distortions are unintentional and inherent to the software    Krishna Stout MD  Answers for HPI/ROS submitted by the patient on 11/29/2021  How many servings of fruits and vegetables do you eat daily?: 2-3  On average, how many sweetened beverages do you drink each day (Examples: soda, juice, sweet tea, etc.  Do NOT count diet or artificially sweetened beverages)?: 0  How many minutes a day do you exercise enough to make your heart beat faster?: 9 or less  How many days a week do you exercise enough to make your heart beat faster?: 4  How many days per week do you miss taking your medication?: 0

## 2022-07-23 ENCOUNTER — HEALTH MAINTENANCE LETTER (OUTPATIENT)
Age: 67
End: 2022-07-23

## 2022-10-01 ENCOUNTER — HEALTH MAINTENANCE LETTER (OUTPATIENT)
Age: 67
End: 2022-10-01

## 2022-10-26 NOTE — TELEPHONE ENCOUNTER
Refill Approved    Rx renewed per Medication Renewal Policy. Medication was last renewed on 11/1/19.    Yeni Quezada, [facility] Connection Triage/Med Refill 10/2/2020     Requested Prescriptions   Pending Prescriptions Disp Refills     sertraline (ZOLOFT) 100 MG tablet [Pharmacy Med Name: SERTRALINE 100MG TABLETS] 45 tablet 11     Sig: TAKE ONE AND ONE-HALF TABLETS BY MOUTH ONCE DAILY       SSRI Refill Protocol  Passed - 9/28/2020  3:35 PM        Passed - PCP or prescribing provider visit in last year     Last office visit with prescriber/PCP: 11/1/2019 Krishna Stout MD OR same dept: 11/1/2019 Krishna Stout MD OR same specialty: 11/1/2019 Krishna Stout MD  Last physical: Visit date not found Last MTM visit: Visit date not found   Next visit within 3 mo: Visit date not found  Next physical within 3 mo: Visit date not found  Prescriber OR PCP: Krishna Stout MD  Last diagnosis associated with med order: 1. Mood disorder (H)  - sertraline (ZOLOFT) 100 MG tablet [Pharmacy Med Name: SERTRALINE 100MG TABLETS]; TAKE ONE AND ONE-HALF TABLETS BY MOUTH ONCE DAILY  Dispense: 45 tablet; Refill: 11    If protocol passes may refill for 12 months if within 3 months of last provider visit (or a total of 15 months).                            
No

## 2022-11-07 ENCOUNTER — OFFICE VISIT (OUTPATIENT)
Dept: FAMILY MEDICINE | Facility: CLINIC | Age: 67
End: 2022-11-07
Payer: COMMERCIAL

## 2022-11-07 DIAGNOSIS — F39 MOOD DISORDER (H): ICD-10-CM

## 2022-11-07 DIAGNOSIS — Z12.11 SCREEN FOR COLON CANCER: ICD-10-CM

## 2022-11-07 DIAGNOSIS — Z12.5 SCREENING FOR PROSTATE CANCER: ICD-10-CM

## 2022-11-07 DIAGNOSIS — I10 ESSENTIAL HYPERTENSION: Primary | ICD-10-CM

## 2022-11-07 LAB
ANION GAP SERPL CALCULATED.3IONS-SCNC: 10 MMOL/L (ref 7–15)
BUN SERPL-MCNC: 12.2 MG/DL (ref 8–23)
CALCIUM SERPL-MCNC: 9.4 MG/DL (ref 8.8–10.2)
CHLORIDE SERPL-SCNC: 98 MMOL/L (ref 98–107)
CHOLEST SERPL-MCNC: 153 MG/DL
CREAT SERPL-MCNC: 1.09 MG/DL (ref 0.67–1.17)
DEPRECATED HCO3 PLAS-SCNC: 30 MMOL/L (ref 22–29)
GFR SERPL CREATININE-BSD FRML MDRD: 75 ML/MIN/1.73M2
GLUCOSE SERPL-MCNC: 94 MG/DL (ref 70–99)
HDLC SERPL-MCNC: 40 MG/DL
LDLC SERPL CALC-MCNC: 86 MG/DL
NONHDLC SERPL-MCNC: 113 MG/DL
POTASSIUM SERPL-SCNC: 5 MMOL/L (ref 3.4–5.3)
PSA SERPL-MCNC: 0.57 NG/ML (ref 0–4.5)
SODIUM SERPL-SCNC: 138 MMOL/L (ref 136–145)
TRIGL SERPL-MCNC: 137 MG/DL

## 2022-11-07 PROCEDURE — G0103 PSA SCREENING: HCPCS | Performed by: FAMILY MEDICINE

## 2022-11-07 PROCEDURE — 90677 PCV20 VACCINE IM: CPT | Performed by: FAMILY MEDICINE

## 2022-11-07 PROCEDURE — 36415 COLL VENOUS BLD VENIPUNCTURE: CPT | Performed by: FAMILY MEDICINE

## 2022-11-07 PROCEDURE — 80048 BASIC METABOLIC PNL TOTAL CA: CPT | Performed by: FAMILY MEDICINE

## 2022-11-07 PROCEDURE — 91312 COVID-19,PF,PFIZER BOOSTER BIVALENT: CPT | Performed by: FAMILY MEDICINE

## 2022-11-07 PROCEDURE — 99214 OFFICE O/P EST MOD 30 MIN: CPT | Mod: 25 | Performed by: FAMILY MEDICINE

## 2022-11-07 PROCEDURE — 0124A COVID-19,PF,PFIZER BOOSTER BIVALENT: CPT | Performed by: FAMILY MEDICINE

## 2022-11-07 PROCEDURE — G0009 ADMIN PNEUMOCOCCAL VACCINE: HCPCS | Performed by: FAMILY MEDICINE

## 2022-11-07 PROCEDURE — 80061 LIPID PANEL: CPT | Performed by: FAMILY MEDICINE

## 2022-11-07 RX ORDER — METOPROLOL SUCCINATE 50 MG/1
50 TABLET, EXTENDED RELEASE ORAL DAILY
Qty: 90 TABLET | Refills: 3
Start: 2022-11-07 | End: 2022-11-30

## 2022-11-07 RX ORDER — LISINOPRIL AND HYDROCHLOROTHIAZIDE 20; 25 MG/1; MG/1
1 TABLET ORAL DAILY
Qty: 90 TABLET | Refills: 3
Start: 2022-11-07 | End: 2022-11-30

## 2022-11-07 NOTE — PROGRESS NOTES
Assessment & Plan     Essential hypertension    Blood pressure is elevated here though has been much better when at home and elsewhere  Continue Metroprolol and lisinopril-hydrochlorothiazide    - Basic metabolic panel  - Lipid panel reflex to direct LDL Fasting  - lisinopril-hydrochlorothiazide (ZESTORETIC) 20-25 MG tablet  Dispense: 90 tablet; Refill: 3  - metoprolol succinate ER (TOPROL XL) 50 MG 24 hr tablet  Dispense: 90 tablet; Refill: 3  - Basic metabolic panel  - Lipid panel reflex to direct LDL Fasting    Mood disorder (H)    Currently stable  Continue sertraline  Reviewed a possible weaning protocol    Screen for colon cancer    Recommended colonoscopy    Screening for prostate cancer    Check a PSA    - PSA, screen  - PSA, screen    Former tobacco use    Recommend that he consider ultrasound of the abdomen to rule out an abdominal aortic aneurysm in the future      Review of external notes as documented elsewhere in note      No follow-ups on file.    Krishna Stout MD  [facility]    Ellen Ulloa is a 67 year old, presenting for the following health issues:  Recheck Medication    This is a pleasant 67-year-old male who presents to the clinic for a medication check.    He has a known history of hypertension as well as a mood disorder associated with significant anxiety.    He continues to take Metroprolol and lisinopril-hydrochlorothiazide for hypertension.  He has had multiple blood pressure readings elsewhere and his readings have been much better.     His medical history is notable for a mood disorder associated with anxiety as well as hypertension.  He continues to take sertraline and would like to consider weaning that.  He completed a ELMER-7 questionnaire. There have beentimes where he can worry about some things.  He can be irritable at times as well.  His mood has generally been stable. He denies significant depression symptoms. He is feeling significantly  better  and less stressed since changing his job.     He denies chest pain or shortness of breath.  He is a former tobacco user.    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       History of Present Illness       Hypertension: He presents for follow up of hypertension.  He does check blood pressure  regularly outside of the clinic. Outpatient blood pressures have not been over 140/90. He does not follow a low salt diet.               Review of Systems         Objective    BP (!) 149/81 (BP Location: Left arm, Patient Position: Sitting, Cuff Size: Adult Regular)   Pulse 57   Resp 16   Wt 98.6 kg (217 lb 6.4 oz)   BMI 30.32 kg/m    Body mass index is 30.32 kg/m .  Physical Exam   GENERAL: healthy, alert and no distress  EYES: Eyes grossly normal to inspection, PERRL and conjunctivae and sclerae normal  RESP: lungs clear to auscultation - no rales, rhonchi or wheezes  CV: regular rate and rhythm, normal S1 S2, no S3 or S4, no murmur, click or rub, no peripheral edema and peripheral pulses strong  MS: no gross musculoskeletal defects noted, no edema  NEURO: Normal strength and tone, mentation intact and speech normal  PSYCH: mentation appears normal, affect normal/bright

## 2022-11-07 NOTE — PATIENT INSTRUCTIONS
Artemio,    Your blood pressure is high here today but has been better when you have checked it elsewhere  Continue to work on your dietary and lifestyle changes  Limit sodium in your diet  You may decrease sertraline to 50 mg a day  Please provide an update if you would like to stay at this dose  If needed you can bump sertraline up to 100 mg  Continue your current blood pressure medications  Set up your colonoscopy when you are ready  You received a COVID and pneumonia shot today  Have your shingles shot as planned  I recommend an ultrasound of the abdomen to rule out an abdominal aortic aneurysm in the future  We discussed also a low-dose cardiac calcium test given your history of tobacco use

## 2022-11-14 VITALS
RESPIRATION RATE: 16 BRPM | SYSTOLIC BLOOD PRESSURE: 128 MMHG | HEART RATE: 57 BPM | DIASTOLIC BLOOD PRESSURE: 72 MMHG | WEIGHT: 217.4 LBS | BODY MASS INDEX: 30.32 KG/M2

## 2022-11-29 DIAGNOSIS — I10 ESSENTIAL HYPERTENSION: ICD-10-CM

## 2022-11-29 DIAGNOSIS — F39 MOOD DISORDER (H): ICD-10-CM

## 2022-11-29 NOTE — TELEPHONE ENCOUNTER
Pharmacy sent this request on 11/28 stating last refill was 10/30/22 for Qty: 30  With 3 refills. Patient wants to ensure there will be more refills come December. Pharmacy needs new prescriptions sent in.

## 2022-11-30 RX ORDER — SERTRALINE HYDROCHLORIDE 100 MG/1
100 TABLET, FILM COATED ORAL DAILY
Qty: 90 TABLET | Refills: 3 | Status: SHIPPED | OUTPATIENT
Start: 2022-11-30 | End: 2023-11-20

## 2022-11-30 RX ORDER — METOPROLOL SUCCINATE 50 MG/1
50 TABLET, EXTENDED RELEASE ORAL DAILY
Qty: 90 TABLET | Refills: 3 | Status: SHIPPED | OUTPATIENT
Start: 2022-11-30 | End: 2023-11-20

## 2022-11-30 RX ORDER — LISINOPRIL AND HYDROCHLOROTHIAZIDE 20; 25 MG/1; MG/1
1 TABLET ORAL DAILY
Qty: 90 TABLET | Refills: 3 | Status: SHIPPED | OUTPATIENT
Start: 2022-11-30 | End: 2023-11-20

## 2022-11-30 NOTE — TELEPHONE ENCOUNTER
"Routing refill request to provider for review/approval because:  Due to medication information not transferring due to [facility] please review the medication information prior to signing to ensure accuracy.    Last Written Prescription Date:  11/29/21  Last Fill Quantity: 30,  # refills: 11   Last office visit provider:  11/7/22     Requested Prescriptions   Pending Prescriptions Disp Refills     sertraline (ZOLOFT) 100 MG tablet 90 tablet 3     Sig: TAKE 1 PO Qday       SSRIs Protocol Passed - 11/29/2022  9:25 AM        Passed - Recent (12 mo) or future (30 days) visit within the authorizing provider's specialty     Patient has had an office visit with the authorizing provider or a provider within the authorizing providers department within the previous 12 mos or has a future within next 30 days. See \"Patient Info\" tab in inbasket, or \"Choose Columns\" in Meds & Orders section of the refill encounter.              Passed - Medication is active on med list        Passed - Patient is age 18 or older         Signed Prescriptions Disp Refills    lisinopril-hydrochlorothiazide (ZESTORETIC) 20-25 MG tablet 90 tablet 3     Sig: Take 1 tablet by mouth daily       Diuretics (Including Combos) Protocol Passed - 11/30/2022 10:35 AM        Passed - Blood pressure under 140/90 in past 12 months     BP Readings from Last 3 Encounters:   11/14/22 128/72   11/29/21 (!) 154/85   10/23/20 (!) 152/97                 Passed - Recent (12 mo) or future (30 days) visit within the authorizing provider's specialty     Patient has had an office visit with the authorizing provider or a provider within the authorizing providers department within the previous 12 mos or has a future within next 30 days. See \"Patient Info\" tab in inbasket, or \"Choose Columns\" in Meds & Orders section of the refill encounter.              Passed - Medication is active on med list        Passed - Patient is age 18 or older        Passed - Normal serum creatinine on file " "in past 12 months     Recent Labs   Lab Test 11/07/22  1208   CR 1.09              Passed - Normal serum potassium on file in past 12 months     Recent Labs   Lab Test 11/07/22  1208   POTASSIUM 5.0                    Passed - Normal serum sodium on file in past 12 months     Recent Labs   Lab Test 11/07/22  1208                ACE Inhibitors (Including Combos) Protocol Passed - 11/30/2022 10:35 AM        Passed - Blood pressure under 140/90 in past 12 months     BP Readings from Last 3 Encounters:   11/14/22 128/72   11/29/21 (!) 154/85   10/23/20 (!) 152/97                 Passed - Recent (12 mo) or future (30 days) visit within the authorizing provider's specialty     Patient has had an office visit with the authorizing provider or a provider within the authorizing providers department within the previous 12 mos or has a future within next 30 days. See \"Patient Info\" tab in inbasket, or \"Choose Columns\" in Meds & Orders section of the refill encounter.              Passed - Medication is active on med list        Passed - Patient is age 18 or older        Passed - Normal serum creatinine on file in past 12 months     Recent Labs   Lab Test 11/07/22  1208   CR 1.09       Ok to refill medication if creatinine is low          Passed - Normal serum potassium on file in past 12 months     Recent Labs   Lab Test 11/07/22  1208   POTASSIUM 5.0               metoprolol succinate ER (TOPROL XL) 50 MG 24 hr tablet 90 tablet 3     Sig: Take 1 tablet (50 mg) by mouth daily       Beta-Blockers Protocol Passed - 11/29/2022  9:25 AM        Passed - Blood pressure under 140/90 in past 12 months     BP Readings from Last 3 Encounters:   11/14/22 128/72   11/29/21 (!) 154/85   10/23/20 (!) 152/97                 Passed - Patient is age 6 or older        Passed - Recent (12 mo) or future (30 days) visit within the authorizing provider's specialty     Patient has had an office visit with the authorizing provider or a provider " "within the authorizing providers department within the previous 12 mos or has a future within next 30 days. See \"Patient Info\" tab in inbasket, or \"Choose Columns\" in Meds & Orders section of the refill encounter.              Passed - Medication is active on med list             Krunal Castle RN 11/30/22 10:39 AM  "

## 2022-11-30 NOTE — TELEPHONE ENCOUNTER
"Outpatient Medication Detail     Disp Refills Start End FRANCESCO   lisinopril-hydrochlorothiazide (ZESTORETIC) 20-25 MG tablet 90 tablet 3 11/7/2022  No   Sig - Route: Take 1 tablet by mouth daily - Oral   Class: No Print Out   Order: [id number]     Outpatient Medication Detail     Disp Refills Start End FRANCESCO   metoprolol succinate ER (TOPROL XL) 50 MG 24 hr tablet 90 tablet 3 11/7/2022  No   Sig - Route: Take 1 tablet (50 mg) by mouth daily - Oral   Class: No Print Out   Order: [id number]     CLASS:  No print.  Sending to pharmacy per protocol.    Last Written Prescription Date:  11/29/21  Last Fill Quantity: 30,  # refills: 11   Last office visit provider:  11/7/22     Requested Prescriptions   Pending Prescriptions Disp Refills     lisinopril-hydrochlorothiazide (ZESTORETIC) 20-25 MG tablet 90 tablet 3     Sig: Take 1 tablet by mouth daily       Diuretics (Including Combos) Protocol Passed - 11/30/2022 10:35 AM        Passed - Blood pressure under 140/90 in past 12 months     BP Readings from Last 3 Encounters:   11/14/22 128/72   11/29/21 (!) 154/85   10/23/20 (!) 152/97                 Passed - Recent (12 mo) or future (30 days) visit within the authorizing provider's specialty     Patient has had an office visit with the authorizing provider or a provider within the authorizing providers department within the previous 12 mos or has a future within next 30 days. See \"Patient Info\" tab in inbasket, or \"Choose Columns\" in Meds & Orders section of the refill encounter.              Passed - Medication is active on med list        Passed - Patient is age 18 or older        Passed - Normal serum creatinine on file in past 12 months     Recent Labs   Lab Test 11/07/22  1208   CR 1.09              Passed - Normal serum potassium on file in past 12 months     Recent Labs   Lab Test 11/07/22  1208   POTASSIUM 5.0                    Passed - Normal serum sodium on file in past 12 months     Recent Labs   Lab Test 11/07/22  1208 " "               ACE Inhibitors (Including Combos) Protocol Passed - 11/30/2022 10:35 AM        Passed - Blood pressure under 140/90 in past 12 months     BP Readings from Last 3 Encounters:   11/14/22 128/72   11/29/21 (!) 154/85   10/23/20 (!) 152/97                 Passed - Recent (12 mo) or future (30 days) visit within the authorizing provider's specialty     Patient has had an office visit with the authorizing provider or a provider within the authorizing providers department within the previous 12 mos or has a future within next 30 days. See \"Patient Info\" tab in inbasket, or \"Choose Columns\" in Meds & Orders section of the refill encounter.              Passed - Medication is active on med list        Passed - Patient is age 18 or older        Passed - Normal serum creatinine on file in past 12 months     Recent Labs   Lab Test 11/07/22  1208   CR 1.09       Ok to refill medication if creatinine is low          Passed - Normal serum potassium on file in past 12 months     Recent Labs   Lab Test 11/07/22  1208   POTASSIUM 5.0                sertraline (ZOLOFT) 100 MG tablet 30 tablet 11     Sig: TAKE 1 PO Qday       SSRIs Protocol Passed - 11/29/2022  9:25 AM        Passed - Recent (12 mo) or future (30 days) visit within the authorizing provider's specialty     Patient has had an office visit with the authorizing provider or a provider within the authorizing providers department within the previous 12 mos or has a future within next 30 days. See \"Patient Info\" tab in inbasket, or \"Choose Columns\" in Meds & Orders section of the refill encounter.              Passed - Medication is active on med list        Passed - Patient is age 18 or older           metoprolol succinate ER (TOPROL XL) 50 MG 24 hr tablet 90 tablet 3     Sig: Take 1 tablet (50 mg) by mouth daily       Beta-Blockers Protocol Passed - 11/29/2022  9:25 AM        Passed - Blood pressure under 140/90 in past 12 months     BP Readings from " "Last 3 Encounters:   11/14/22 128/72   11/29/21 (!) 154/85   10/23/20 (!) 152/97                 Passed - Patient is age 6 or older        Passed - Recent (12 mo) or future (30 days) visit within the authorizing provider's specialty     Patient has had an office visit with the authorizing provider or a provider within the authorizing providers department within the previous 12 mos or has a future within next 30 days. See \"Patient Info\" tab in inbasket, or \"Choose Columns\" in Meds & Orders section of the refill encounter.              Passed - Medication is active on med list             Krunal Castle RN 11/30/22 10:37 AM  "

## 2023-07-12 ENCOUNTER — TRANSFERRED RECORDS (OUTPATIENT)
Dept: HEALTH INFORMATION MANAGEMENT | Facility: CLINIC | Age: 68
End: 2023-07-12
Payer: COMMERCIAL

## 2023-07-27 PROBLEM — D12.6 ADENOMATOUS COLON POLYP: Status: ACTIVE | Noted: 2023-07-27

## 2023-08-06 ENCOUNTER — HEALTH MAINTENANCE LETTER (OUTPATIENT)
Age: 68
End: 2023-08-06

## 2023-11-19 ASSESSMENT — ENCOUNTER SYMPTOMS
JOINT SWELLING: 0
PARESTHESIAS: 0
DYSURIA: 0
DIARRHEA: 0
ABDOMINAL PAIN: 0
DIZZINESS: 0
EYE PAIN: 0
SHORTNESS OF BREATH: 0
HEMATOCHEZIA: 0
SORE THROAT: 0
CONSTIPATION: 0
CHILLS: 0
WEAKNESS: 0
FEVER: 0
MYALGIAS: 0
PALPITATIONS: 0
FREQUENCY: 1
HEARTBURN: 0
NAUSEA: 0
HEMATURIA: 0
ARTHRALGIAS: 0
COUGH: 0
HEADACHES: 0
NERVOUS/ANXIOUS: 0

## 2023-11-19 ASSESSMENT — ACTIVITIES OF DAILY LIVING (ADL): CURRENT_FUNCTION: NO ASSISTANCE NEEDED

## 2023-11-20 ENCOUNTER — OFFICE VISIT (OUTPATIENT)
Dept: FAMILY MEDICINE | Facility: CLINIC | Age: 68
End: 2023-11-20
Payer: COMMERCIAL

## 2023-11-20 VITALS
DIASTOLIC BLOOD PRESSURE: 70 MMHG | HEART RATE: 78 BPM | RESPIRATION RATE: 16 BRPM | HEIGHT: 71 IN | SYSTOLIC BLOOD PRESSURE: 138 MMHG | TEMPERATURE: 99.1 F | WEIGHT: 212.6 LBS | OXYGEN SATURATION: 96 % | BODY MASS INDEX: 29.76 KG/M2

## 2023-11-20 DIAGNOSIS — Z12.5 SCREENING FOR PROSTATE CANCER: ICD-10-CM

## 2023-11-20 DIAGNOSIS — Z29.11 NEED FOR VACCINATION AGAINST RESPIRATORY SYNCYTIAL VIRUS: ICD-10-CM

## 2023-11-20 DIAGNOSIS — Z13.220 SCREENING CHOLESTEROL LEVEL: ICD-10-CM

## 2023-11-20 DIAGNOSIS — Z23 NEED FOR TDAP VACCINATION: ICD-10-CM

## 2023-11-20 DIAGNOSIS — L98.9 SKIN LESION: ICD-10-CM

## 2023-11-20 DIAGNOSIS — I10 ESSENTIAL HYPERTENSION: ICD-10-CM

## 2023-11-20 DIAGNOSIS — Z00.00 WELCOME TO MEDICARE PREVENTIVE VISIT: Primary | ICD-10-CM

## 2023-11-20 DIAGNOSIS — F39 MOOD DISORDER (H): ICD-10-CM

## 2023-11-20 DIAGNOSIS — D12.6 ADENOMATOUS POLYP OF COLON, UNSPECIFIED PART OF COLON: ICD-10-CM

## 2023-11-20 LAB
ERYTHROCYTE [DISTWIDTH] IN BLOOD BY AUTOMATED COUNT: 12.1 % (ref 10–15)
HCT VFR BLD AUTO: 45.5 % (ref 40–53)
HGB BLD-MCNC: 15.4 G/DL (ref 13.3–17.7)
MCH RBC QN AUTO: 30.3 PG (ref 26.5–33)
MCHC RBC AUTO-ENTMCNC: 33.8 G/DL (ref 31.5–36.5)
MCV RBC AUTO: 90 FL (ref 78–100)
PLATELET # BLD AUTO: 158 10E3/UL (ref 150–450)
RBC # BLD AUTO: 5.08 10E6/UL (ref 4.4–5.9)
WBC # BLD AUTO: 9.2 10E3/UL (ref 4–11)

## 2023-11-20 PROCEDURE — 91320 SARSCV2 VAC 30MCG TRS-SUC IM: CPT | Performed by: FAMILY MEDICINE

## 2023-11-20 PROCEDURE — 90662 IIV NO PRSV INCREASED AG IM: CPT | Performed by: FAMILY MEDICINE

## 2023-11-20 PROCEDURE — 85027 COMPLETE CBC AUTOMATED: CPT | Performed by: FAMILY MEDICINE

## 2023-11-20 PROCEDURE — 82248 BILIRUBIN DIRECT: CPT | Performed by: FAMILY MEDICINE

## 2023-11-20 PROCEDURE — 80061 LIPID PANEL: CPT | Performed by: FAMILY MEDICINE

## 2023-11-20 PROCEDURE — 36415 COLL VENOUS BLD VENIPUNCTURE: CPT | Performed by: FAMILY MEDICINE

## 2023-11-20 PROCEDURE — 90480 ADMN SARSCOV2 VAC 1/ONLY CMP: CPT | Performed by: FAMILY MEDICINE

## 2023-11-20 PROCEDURE — G0438 PPPS, INITIAL VISIT: HCPCS | Performed by: FAMILY MEDICINE

## 2023-11-20 PROCEDURE — G0103 PSA SCREENING: HCPCS | Performed by: FAMILY MEDICINE

## 2023-11-20 PROCEDURE — 99214 OFFICE O/P EST MOD 30 MIN: CPT | Mod: 25 | Performed by: FAMILY MEDICINE

## 2023-11-20 PROCEDURE — G0008 ADMIN INFLUENZA VIRUS VAC: HCPCS | Performed by: FAMILY MEDICINE

## 2023-11-20 PROCEDURE — 80053 COMPREHEN METABOLIC PANEL: CPT | Performed by: FAMILY MEDICINE

## 2023-11-20 RX ORDER — LISINOPRIL AND HYDROCHLOROTHIAZIDE 20; 25 MG/1; MG/1
1 TABLET ORAL DAILY
Qty: 90 TABLET | Refills: 4 | Status: SHIPPED | OUTPATIENT
Start: 2023-11-20

## 2023-11-20 RX ORDER — RESPIRATORY SYNCYTIAL VIRUS VACCINE 120MCG/0.5
0.5 KIT INTRAMUSCULAR ONCE
Qty: 1 EACH | Refills: 0 | Status: CANCELLED | OUTPATIENT
Start: 2023-11-20 | End: 2023-11-20

## 2023-11-20 RX ORDER — SERTRALINE HYDROCHLORIDE 100 MG/1
100 TABLET, FILM COATED ORAL DAILY
Qty: 90 TABLET | Refills: 4 | Status: SHIPPED | OUTPATIENT
Start: 2023-11-20

## 2023-11-20 RX ORDER — METOPROLOL SUCCINATE 50 MG/1
50 TABLET, EXTENDED RELEASE ORAL DAILY
Qty: 90 TABLET | Refills: 4 | Status: SHIPPED | OUTPATIENT
Start: 2023-11-20

## 2023-11-20 ASSESSMENT — ENCOUNTER SYMPTOMS
DIARRHEA: 0
ABDOMINAL PAIN: 0
HEMATOCHEZIA: 0
NERVOUS/ANXIOUS: 0
WEAKNESS: 0
PARESTHESIAS: 0
ARTHRALGIAS: 0
FREQUENCY: 1
PALPITATIONS: 0
HEMATURIA: 0
MYALGIAS: 0
SORE THROAT: 0
HEADACHES: 0
DYSURIA: 0
DIZZINESS: 0
CONSTIPATION: 0
SHORTNESS OF BREATH: 0
COUGH: 0
EYE PAIN: 0
CHILLS: 0
JOINT SWELLING: 0
NAUSEA: 0
FEVER: 0
HEARTBURN: 0

## 2023-11-20 ASSESSMENT — ACTIVITIES OF DAILY LIVING (ADL): CURRENT_FUNCTION: NO ASSISTANCE NEEDED

## 2023-11-20 NOTE — PATIENT INSTRUCTIONS
Artemio,    It was good to see you  He received a COVID and influenza vaccine today  We we will check your laboratory testing as noted  I recommend that you consider an ultrasound of the abdomen to rule out an aneurysm.  You can check with insurance regarding coverage  I encourage you to consider a tetanus booster and the RSV vaccine at your local pharmacy  I will enter referral for you to see dermatology  ([facility])  Continue to monitor your blood pressure.    Krishna Stotu MD                    Patient Education   Personalized Prevention Plan  You are due for the preventive services outlined below.   Your care team can assist in scheduling these services.  If you have already completed any of these items, please share that information with your care team to update in your medical record.    Health Maintenance Due   Topic Date Due    ANNUAL REVIEW OF HM ORDERS  Never done    LUNG CANCER SCREENING  Never done    RSV VACCINE (Pregnancy & 60+) (1 - 1-dose 60+ series) Never done    Annual Wellness Visit  Never done    AORTIC ANEURYSM SCREENING (SYSTEM ASSIGNED)  Never done    Diptheria Tetanus Pertussis (DTAP/TDAP/TD) Vaccine (3 - Td or Tdap) 03/21/2023    Flu Vaccine (1) 09/01/2023    COVID-19 Vaccine (5 - 2023-24 season) 09/01/2023    Zoster (Shingles) Vaccine (2 of 2) 01/27/2023     Hearing Loss: Care Instructions  Overview     Hearing loss is a sudden or slow decrease in how well you hear. It can range from slight to profound. Permanent hearing loss can occur with aging. It also can happen when you are exposed long-term to loud noise. Examples include listening to loud music, riding motorcycles, or being around other loud machines.  Hearing loss can affect your work and home life. It can make you feel lonely or depressed. You may feel that you have lost your independence. But hearing aids and other devices can help you hear better and feel connected to others.  Follow-up care is a key part of your  treatment and safety. Be sure to make and go to all appointments, and call your doctor if you are having problems. It's also a good idea to know your test results and keep a list of the medicines you take.  How can you care for yourself at home?  Avoid loud noises whenever possible. This helps keep your hearing from getting worse.  Always wear hearing protection around loud noises.  Wear a hearing aid as directed.  A professional can help you pick a hearing aid that will work best for you.  You can also get hearing aids over the counter for mild to moderate hearing loss.  Have hearing tests as your doctor suggests. They can show whether your hearing has changed. Your hearing aid may need to be adjusted.  Use other devices as needed. These may include:  Telephone amplifiers and hearing aids that can connect to a television, stereo, radio, or microphone.  Devices that use lights or vibrations. These alert you to the doorbell, a ringing telephone, or a baby monitor.  Television closed-captioning. This shows the words at the bottom of the screen. Most new TVs can do this.  TTY (text telephone). This lets you type messages back and forth on the telephone instead of talking or listening. These devices are also called TDD. When messages are typed on the keyboard, they are sent over the phone line to a receiving TTY. The message is shown on a monitor.  Use text messaging, social media, and email if it is hard for you to communicate by telephone.  Try to learn a listening technique called speechreading. It is not lipreading. You pay attention to people's gestures, expressions, posture, and tone of voice. These clues can help you understand what a person is saying. Face the person you are talking to, and have them face you. Make sure the lighting is good. You need to see the other person's face clearly.  Think about counseling if you need help to adjust to your hearing loss.  When should you call for help?  Watch closely for  "changes in your health, and be sure to contact your doctor if:    You think your hearing is getting worse.     You have new symptoms, such as dizziness or nausea.   Where can you learn more?  Go to https://www.[employer].net/patiented  Enter R798 in the search box to learn more about \"Hearing Loss: Care Instructions.\"  Current as of: February 28, 2023               Content Version: 13.8    0826-6794 [employer].   Care instructions adapted under license by your healthcare professional. If you have questions about a medical condition or this instruction, always ask your healthcare professional. [employer] disclaims any warranty or liability for your use of this information.      Bladder Training: Care Instructions  Your Care Instructions     Bladder training is used to treat urge incontinence and stress incontinence. Urge incontinence means that the need to urinate comes on so fast that you can't get to a toilet in time. Stress incontinence means that you leak urine because of pressure on your bladder. For example, it may happen when you laugh, cough, or lift something heavy.  Bladder training can increase how long you can wait before you have to urinate. It can also help your bladder hold more urine. And it can give you better control over the urge to urinate.  It is important to remember that bladder training takes a few weeks to a few months to make a difference. You may not see results right away, but don't give up.  Follow-up care is a key part of your treatment and safety. Be sure to make and go to all appointments, and call your doctor if you are having problems. It's also a good idea to know your test results and keep a list of the medicines you take.  How can you care for yourself at home?  Work with your doctor to come up with a bladder training program that is right for you. You may use one or more of the following methods.  Delayed urination  In the beginning, try to keep from " "urinating for 5 minutes after you first feel the need to go.  While you wait, take deep, slow breaths to relax. Kegel exercises can also help you delay the need to go to the bathroom.  After some practice, when you can easily wait 5 minutes to urinate, try to wait 10 minutes before you urinate.  Slowly increase the waiting period until you are able to control when you have to urinate.  Scheduled urination  Empty your bladder when you first wake up in the morning.  Schedule times throughout the day when you will urinate.  Start by going to the bathroom every hour, even if you don't need to go.  Slowly increase the time between trips to the bathroom.  When you have found a schedule that works well for you, keep doing it.  If you wake up during the night and have to urinate, do it. Apply your schedule to waking hours only.  Kegel exercises  These tighten and strengthen pelvic muscles, which can help you control the flow of urine. (If doing these exercises causes pain, stop doing them and talk with your doctor.) To do Kegel exercises:  Squeeze your muscles as if you were trying not to pass gas. Or squeeze your muscles as if you were stopping the flow of urine. Your belly, legs, and buttocks shouldn't move.  Hold the squeeze for 3 seconds, then relax for 5 to 10 seconds.  Start with 3 seconds, then add 1 second each week until you are able to squeeze for 10 seconds.  Repeat the exercise 10 times a session. Do 3 to 8 sessions a day.  When should you call for help?  Watch closely for changes in your health, and be sure to contact your doctor if:    Your incontinence is getting worse.     You do not get better as expected.   Where can you learn more?  Go to https://www.[employer].net/patiented  Enter V684 in the search box to learn more about \"Bladder Training: Care Instructions.\"  Current as of: February 28, 2023               Content Version: 13.8    [id number] [employer].   Care instructions adapted under " license by your healthcare professional. If you have questions about a medical condition or this instruction, always ask your healthcare professional. [employer] disclaims any warranty or liability for your use of this information.

## 2023-11-20 NOTE — PROGRESS NOTES
"SUBJECTIVE:   Artemio is a 68 year old, presenting for the following:  Medicare Visit (Pt is fasting )        11/20/2023     2:15 PM   Additional Questions   Roomed by Cinda SIEGEL CMA     Artemio is a pleasant 68-year-old male who presents to the clinic for a Welcome to Medicare visit.    His medical history is notable for hypertension, benign prostatic hypertrophy, impaired fasting glucose levels, anxiety, and adenomatous polyps.    He continues to take lisinopril-hydrochlorothiazide and metoprolol for blood pressure.  He has multiple blood pressure readings and systolic blood pressure readings typically range from 120-138 over diastolic readings ranging from 70-80.  He has been consistent in taking his medications.    He has a history of anxiety and continues to take sertraline.  At one point he tried to wean off this medication but had an increase in anxiety and therefore resumed his typical dose.    He continues experience some urinary frequency but this has not been a significant problem.    His last colonoscopy was in July of 2023.  This revealed a tubular adenoma and to be due again in 5 years.    He would like to follow-up with dermatology for skin check.    Overall, he has been feeling well.  He tolerates physical activity without difficulty.  He denies other significant health changes.        Are you in the first 12 months of your Medicare coverage?  Yes,  Visual Acuity:  Right Eye: 10/10   Left Eye: 10/12.5  Both Eyes: 10/12.5    Healthy Habits:     In general, how would you rate your overall health?  Good    Frequency of exercise:  4-5 days/week    Duration of exercise:  30-45 minutes    Do you usually eat at least 4 servings of fruit and vegetables a day, include whole grains    & fiber and avoid regularly eating high fat or \"junk\" foods?  Yes    Taking medications regularly:  Yes    Medication side effects:  None    Ability to successfully perform activities of daily living:  No assistance needed    Home Safety:  " No safety concerns identified    Hearing Impairment:  Difficulty following a conversation in a noisy restaurant or crowded room    In the past 6 months, have you been bothered by leaking of urine? Yes    In general, how would you rate your overall mental or emotional health?  Good    Additional concerns today:  No      Today's PHQ-2 Score:       11/19/2023    11:12 AM   PHQ-2 ( 1999 [employer])   Q1: Little interest or pleasure in doing things 0   Q2: Feeling down, depressed or hopeless 0   PHQ-2 Score 0   Q1: Little interest or pleasure in doing things Not at all   Q2: Feeling down, depressed or hopeless Not at all   PHQ-2 Score 0           Have you ever done Advance Care Planning? (For example, a Health Directive, POLST, or a discussion with a medical provider or your loved ones about your wishes): No, advance care planning information given to patient to review.  Patient plans to discuss their wishes with loved ones or provider.         Fall risk  Fallen 2 or more times in the past year?: No  Any fall with injury in the past year?: No    Cognitive Screening   1) Repeat 3 items (Leader, Season, Table)    2) Clock draw: ABNORMAL correct number around the clock but the hands are not at 11:10  3) 3 item recall: Recalls 2 objects   Results: ABNORMAL clock, 1-2 items recalled: PROBABLE COGNITIVE IMPAIRMENT, **INFORM PROVIDER**    Mini-CogTM Copyright ROSA Steven. Licensed by the author for use in [facility]; reprinted with permission (pippa@[facility]). All rights reserved.      Do you have sleep apnea, excessive snoring or daytime drowsiness? : no    Reviewed and updated as needed this visit by clinical staff   Tobacco  Allergies  Meds              Reviewed and updated as needed this visit by Provider                 Social History     Tobacco Use    Smoking status: Former    Smokeless tobacco: Never   Substance Use Topics    Alcohol use: Not on file             11/19/2023    11:11 AM   Alcohol Use   Prescreen:  >3 drinks/day or >7 drinks/week? No          No data to display              Do you have a current opioid prescription? No  Do you use any other controlled substances or medications that are not prescribed by a provider? None      Current providers sharing in care for this patient include: Patient Care Team:  Krishna Stout MD as PCP - General (Family Practice)  Krishna Stout MD as Assigned PCP    The following health maintenance items are reviewed in Epic and correct as of today:  Health Maintenance   Topic Date Due    ANNUAL REVIEW OF HM ORDERS  Never done    ADVANCE CARE PLANNING  Never done    LUNG CANCER SCREENING  Never done    RSV VACCINE (Pregnancy & 60+) (1 - 1-dose 60+ series) Never done    MEDICARE ANNUAL WELLNESS VISIT  Never done    AORTIC ANEURYSM SCREENING (SYSTEM ASSIGNED)  Never done    DTAP/TDAP/TD IMMUNIZATION (3 - Td or Tdap) 03/21/2023    INFLUENZA VACCINE (1) 09/01/2023    COVID-19 Vaccine (5 - 2023-24 season) 09/01/2023    ZOSTER IMMUNIZATION (2 of 2) 01/27/2023    FALL RISK ASSESSMENT  11/20/2024    LIPID  11/07/2027    COLORECTAL CANCER SCREENING  07/12/2028    HEPATITIS C SCREENING  Completed    PHQ-2 (once per calendar year)  Completed    Pneumococcal Vaccine: 65+ Years  Completed    IPV IMMUNIZATION  Aged Out    HPV IMMUNIZATION  Aged Out    MENINGITIS IMMUNIZATION  Aged Out    RSV MONOCLONAL ANTIBODY  Aged Out     Patient Active Problem List   Diagnosis    Diverticulosis    Umbilical Hernia    Essential Hypertension    Benign Prostatic Hypertrophy    Adjustment disorder with depressed mood    Anxiety    Impaired fasting glucose    Diverticulosis    Mood disorder (H24)    Adenomatous colon polyp     Past Surgical History:   Procedure Laterality Date    HC REMOVAL OF TONSILS,<13 Y/O      Description: Tonsillectomy;  Recorded: 03/11/2008;    PA LAP,HERNIA REPAIR PROC,UNLIST      Description: Laparoscopy Repair Of Hernia;  Recorded: 03/19/2013;    ZZHC REPAIR UMBILICAL  AYAN,<8Y/O,REDUC      Description: Umbilical Hernia Repair;  Recorded: 07/09/2013;       Social History     Tobacco Use    Smoking status: Former    Smokeless tobacco: Never   Substance Use Topics    Alcohol use: Not on file     Family History   Problem Relation Age of Onset    Diabetes Mother     Thyroid Disease Mother     Dementia Mother     Alzheimer Disease Mother     Cancer Father     Heart Disease Brother     Coronary Artery Disease Brother     Parkinsonism Maternal Grandfather          Current Outpatient Medications   Medication Sig Dispense Refill    lisinopril-hydrochlorothiazide (ZESTORETIC) 20-25 MG tablet Take 1 tablet by mouth daily 90 tablet 4    metoprolol succinate ER (TOPROL XL) 50 MG 24 hr tablet Take 1 tablet (50 mg) by mouth daily 90 tablet 4    sertraline (ZOLOFT) 100 MG tablet Take 1 tablet (100 mg) by mouth daily 90 tablet 4     No Known Allergies          Review of Systems   Constitutional:  Negative for chills and fever.   HENT:  Positive for hearing loss. Negative for congestion, ear pain and sore throat.    Eyes:  Negative for pain and visual disturbance.   Respiratory:  Negative for cough and shortness of breath.    Cardiovascular:  Negative for chest pain, palpitations and peripheral edema.   Gastrointestinal:  Negative for abdominal pain, constipation, diarrhea, heartburn, hematochezia and nausea.   Genitourinary:  Positive for frequency, impotence and urgency. Negative for dysuria, genital sores, hematuria and penile discharge.   Musculoskeletal:  Negative for arthralgias, joint swelling and myalgias.   Skin:  Negative for rash.   Neurological:  Negative for dizziness, weakness, headaches and paresthesias.   Psychiatric/Behavioral:  Negative for mood changes. The patient is not nervous/anxious.      Constitutional, HEENT, cardiovascular, pulmonary, GI, , musculoskeletal, neuro, skin, endocrine and psych systems are negative, except as otherwise noted.    OBJECTIVE:   BP (!) 164/84  "(BP Location: Left arm, Patient Position: Sitting, Cuff Size: Adult Regular)   Pulse 78   Temp 99.1  F (37.3  C) (Oral)   Resp 16   Ht 1.803 m (5' 11\")   Wt 96.4 kg (212 lb 9.6 oz)   SpO2 96%   BMI 29.65 kg/m   Estimated body mass index is 29.65 kg/m  as calculated from the following:    Height as of this encounter: 1.803 m (5' 11\").    Weight as of this encounter: 96.4 kg (212 lb 9.6 oz).  Physical Exam  GENERAL: healthy, alert and no distress  EYES: Eyes grossly normal to inspection, PERRL and conjunctivae and sclerae normal  HENT: ear canals and TM's normal, nose and mouth without ulcers or lesions  NECK: no adenopathy, no asymmetry, masses, or scars and thyroid normal to palpation  RESP: lungs clear to auscultation - no rales, rhonchi or wheezes  CV: regular rate and rhythm, normal S1 S2, no S3 or S4, no murmur, click or rub, no peripheral edema and peripheral pulses strong  MS: no gross musculoskeletal defects noted, no edema  SKIN: no suspicious lesions or rashes  NEURO: Normal strength and tone, mentation intact and speech normal  PSYCH: mentation appears normal, affect normal/bright    Diagnostic Test Results:  Labs reviewed in Epic    ASSESSMENT / PLAN:   Artemio was seen today for medicare visit.    Diagnoses and all orders for this visit:    Welcome to Medicare preventive visit    Essential hypertension    Blood pressure is initially elevated in the clinic   Reviewed home blood pressure measurements which are normotensive  Continue her medications  Check a basic metabolic panel  Continue to work on dietary and lifestyle changes    -     lisinopril-hydrochlorothiazide (ZESTORETIC) 20-25 MG tablet; Take 1 tablet by mouth daily  -     metoprolol succinate ER (TOPROL XL) 50 MG 24 hr tablet; Take 1 tablet (50 mg) by mouth daily  -     Basic metabolic panel; Future  -     CBC with platelets; Future  -     Hepatic function panel; Future    Mood disorder (H24)    Continue sertraline  He attempted to wean last " "year but had returned to his 100 mg dose and he has been stable since then      -     sertraline (ZOLOFT) 100 MG tablet; Take 1 tablet (100 mg) by mouth daily    Need for Tdap vaccination    He will consider getting this at his pharmacy    Need for vaccination against respiratory syncytial virus    He will consider getting this at his pharmacy    Screening for prostate cancer  -     PSA, screen; Future    Adenomatous polyp of colon, unspecified part of colon    His colonoscopy is up-to-date from July 2023.  This revealed a tubular adenoma  He will be due in 2028    Screening cholesterol level    -     Lipid panel reflex to direct LDL Fasting; Future    Skin lesion    Refer to dermatology for skin check  -     Adult Dermatology  Referral; Future    Other orders  -     REVIEW OF HEALTH MAINTENANCE PROTOCOL ORDERS  -     INFLUENZA VACCINE 65+ (FLUZONE HD)  -     COVID-19 12+ (2023-24) (PFIZER)  -     PRIMARY CARE FOLLOW-UP SCHEDULING; Future        Patient has been advised of split billing requirements and indicates understanding: Yes      COUNSELING:  Reviewed preventive health counseling, as reflected in patient instructions       Regular exercise       Healthy diet/nutrition       Dental care       Alcohol Use        Colon cancer screening       Prostate cancer screening      BMI:   Estimated body mass index is 29.65 kg/m  as calculated from the following:    Height as of this encounter: 1.803 m (5' 11\").    Weight as of this encounter: 96.4 kg (212 lb 9.6 oz).   Weight management plan: Discussed healthy diet and exercise guidelines      He reports that he has quit smoking. He has never used smokeless tobacco.      Appropriate preventive services were discussed with this patient, including applicable screening as appropriate for fall prevention, nutrition, physical activity, Tobacco-use cessation, weight loss and cognition.  Checklist reviewing preventive services available has been given to the " patient.    Reviewed patients plan of care and provided an AVS. The Basic Care Plan (routine screening as documented in Health Maintenance) for Jaime meets the Care Plan requirement. This Care Plan has been established and reviewed with the Patient.          Krishna Stout MD  [facility]    Identified Health Risks:  I have reviewed Opioid Use Disorder and Substance Use Disorder risk factors and made any needed referrals.

## 2023-11-20 NOTE — PROGRESS NOTES
"The patient was provided with written information regarding signs of hearing loss.  Information on urinary incontinence and treatment options given to patient.  Answers submitted by the patient for this visit:  Annual Preventive Visit (Submitted on 11/19/2023)  Chief Complaint: Annual Exam:  In general, how would you rate your overall physical health?: good  Frequency of exercise:: 4-5 days/week  Do you usually eat at least 4 servings of fruit and vegetables a day, include whole grains & fiber, and avoid regularly eating high fat or \"junk\" foods? : Yes  Taking medications regularly:: Yes  Medication side effects:: None  Activities of Daily Living: no assistance needed  Home safety: no safety concerns identified  Hearing Impairment:: difficulty following a conversation in a noisy restaurant or crowded room  In the past 6 months, have you been bothered by leaking of urine?: Yes  abdominal pain: No  Blood in stool: No  Blood in urine: No  chest pain: No  chills: No  congestion: No  constipation: No  cough: No  diarrhea: No  dizziness: No  ear pain: No  eye pain: No  nervous/anxious: No  fever: No  frequency: Yes  genital sores: No  headaches: No  hearing loss: Yes  heartburn: No  arthralgias: No  joint swelling: No  peripheral edema: No  mood changes: No  myalgias: No  nausea: No  dysuria: No  palpitations: No  Skin sensation changes: No  sore throat: No  urgency: Yes  rash: No  shortness of breath: No  visual disturbance: No  weakness: No  impotence: Yes  penile discharge: No  In general, how would you rate your overall mental or emotional health?: good  Additional concerns today:: No  Exercise outside of work (Submitted on 11/19/2023)  Chief Complaint: Annual Exam:  Duration of exercise:: 30-45 minutes    "

## 2023-11-21 LAB
ALBUMIN SERPL BCG-MCNC: 4.2 G/DL (ref 3.5–5.2)
ALP SERPL-CCNC: 57 U/L (ref 40–150)
ALT SERPL W P-5'-P-CCNC: 41 U/L (ref 0–70)
ANION GAP SERPL CALCULATED.3IONS-SCNC: 13 MMOL/L (ref 7–15)
AST SERPL W P-5'-P-CCNC: 42 U/L (ref 0–45)
BILIRUB DIRECT SERPL-MCNC: 0.28 MG/DL (ref 0–0.3)
BILIRUB SERPL-MCNC: 0.6 MG/DL
BUN SERPL-MCNC: 22 MG/DL (ref 8–23)
CALCIUM SERPL-MCNC: 9.7 MG/DL (ref 8.8–10.2)
CHLORIDE SERPL-SCNC: 103 MMOL/L (ref 98–107)
CHOLEST SERPL-MCNC: 131 MG/DL
CREAT SERPL-MCNC: 1.05 MG/DL (ref 0.67–1.17)
DEPRECATED HCO3 PLAS-SCNC: 26 MMOL/L (ref 22–29)
EGFRCR SERPLBLD CKD-EPI 2021: 78 ML/MIN/1.73M2
GLUCOSE SERPL-MCNC: 89 MG/DL (ref 70–99)
HDLC SERPL-MCNC: 65 MG/DL
LDLC SERPL CALC-MCNC: 55 MG/DL
NONHDLC SERPL-MCNC: 66 MG/DL
POTASSIUM SERPL-SCNC: 4.7 MMOL/L (ref 3.4–5.3)
PROT SERPL-MCNC: 7 G/DL (ref 6.4–8.3)
PSA SERPL DL<=0.01 NG/ML-MCNC: 0.62 NG/ML (ref 0–4.5)
SODIUM SERPL-SCNC: 142 MMOL/L (ref 135–145)
TRIGL SERPL-MCNC: 53 MG/DL

## 2023-11-23 ENCOUNTER — MYC MEDICAL ADVICE (OUTPATIENT)
Dept: FAMILY MEDICINE | Facility: CLINIC | Age: 68
End: 2023-11-23
Payer: COMMERCIAL

## 2023-12-11 ENCOUNTER — PATIENT OUTREACH (OUTPATIENT)
Dept: GASTROENTEROLOGY | Facility: CLINIC | Age: 68
End: 2023-12-11
Payer: COMMERCIAL

## 2024-10-09 ENCOUNTER — MYC MEDICAL ADVICE (OUTPATIENT)
Dept: FAMILY MEDICINE | Facility: CLINIC | Age: 69
End: 2024-10-09
Payer: COMMERCIAL

## 2024-10-09 DIAGNOSIS — F39 MOOD DISORDER (H): ICD-10-CM

## 2024-10-09 DIAGNOSIS — I10 ESSENTIAL HYPERTENSION: ICD-10-CM

## 2024-10-09 NOTE — TELEPHONE ENCOUNTER
Attempted to reach Jaime ALONZO Monitor in regards to their lisinopril/hydrochlorothiazide being overdue for refill. Left voicemail, with call back number, requesting return call. Per our records last filled 5/23/24 for 90 day supply and is 49 days late to refill.      Krista Munguia, PharmD, BCACP  Population Health Pharmacist  [phone]

## 2024-10-11 RX ORDER — LISINOPRIL AND HYDROCHLOROTHIAZIDE 20; 25 MG/1; MG/1
1 TABLET ORAL DAILY
Qty: 100 TABLET | Refills: 0 | Status: SHIPPED | OUTPATIENT
Start: 2024-10-11

## 2024-10-11 RX ORDER — METOPROLOL SUCCINATE 50 MG/1
50 TABLET, EXTENDED RELEASE ORAL DAILY
Qty: 100 TABLET | Refills: 0 | Status: SHIPPED | OUTPATIENT
Start: 2024-10-11

## 2024-10-11 RX ORDER — SERTRALINE HYDROCHLORIDE 100 MG/1
100 TABLET, FILM COATED ORAL DAILY
Qty: 100 TABLET | Refills: 0 | Status: SHIPPED | OUTPATIENT
Start: 2024-10-11

## 2024-10-11 NOTE — TELEPHONE ENCOUNTER
Patient has [facility] coverage and with this insurance plan, the patient is eligible to receive certain prescriptions as a 100-day supply at the 90-day supply cost.      Prescriptions updated to 100-day supply per protocol: metoprolol, sertraline, and lisinopril-hydrochlorothiazide       Krista Kline, PharmD, [facility]  Population Health Pharmacist  [phone]

## 2024-11-16 SDOH — HEALTH STABILITY: PHYSICAL HEALTH: ON AVERAGE, HOW MANY MINUTES DO YOU ENGAGE IN EXERCISE AT THIS LEVEL?: 40 MIN

## 2024-11-16 SDOH — HEALTH STABILITY: PHYSICAL HEALTH: ON AVERAGE, HOW MANY DAYS PER WEEK DO YOU ENGAGE IN MODERATE TO STRENUOUS EXERCISE (LIKE A BRISK WALK)?: 4 DAYS

## 2024-11-16 ASSESSMENT — SOCIAL DETERMINANTS OF HEALTH (SDOH): HOW OFTEN DO YOU GET TOGETHER WITH FRIENDS OR RELATIVES?: ONCE A WEEK

## 2024-11-19 ENCOUNTER — OFFICE VISIT (OUTPATIENT)
Dept: FAMILY MEDICINE | Facility: CLINIC | Age: 69
End: 2024-11-19
Payer: COMMERCIAL

## 2024-11-19 VITALS
HEART RATE: 59 BPM | BODY MASS INDEX: 28.34 KG/M2 | DIASTOLIC BLOOD PRESSURE: 69 MMHG | SYSTOLIC BLOOD PRESSURE: 149 MMHG | WEIGHT: 202.4 LBS | HEIGHT: 71 IN | OXYGEN SATURATION: 97 % | RESPIRATION RATE: 16 BRPM | TEMPERATURE: 97.7 F

## 2024-11-19 DIAGNOSIS — D12.6 ADENOMATOUS POLYP OF COLON, UNSPECIFIED PART OF COLON: ICD-10-CM

## 2024-11-19 DIAGNOSIS — R35.1 BENIGN PROSTATIC HYPERPLASIA WITH NOCTURIA: ICD-10-CM

## 2024-11-19 DIAGNOSIS — F39 MOOD DISORDER (H): ICD-10-CM

## 2024-11-19 DIAGNOSIS — I10 ESSENTIAL HYPERTENSION: ICD-10-CM

## 2024-11-19 DIAGNOSIS — Z00.00 ENCOUNTER FOR MEDICARE ANNUAL WELLNESS EXAM: Primary | ICD-10-CM

## 2024-11-19 DIAGNOSIS — N40.1 BENIGN PROSTATIC HYPERPLASIA WITH NOCTURIA: ICD-10-CM

## 2024-11-19 DIAGNOSIS — Z12.5 SCREENING FOR PROSTATE CANCER: ICD-10-CM

## 2024-11-19 LAB
ERYTHROCYTE [DISTWIDTH] IN BLOOD BY AUTOMATED COUNT: 12.4 % (ref 10–15)
HCT VFR BLD AUTO: 49.9 % (ref 40–53)
HGB BLD-MCNC: 16.7 G/DL (ref 13.3–17.7)
MCH RBC QN AUTO: 28.3 PG (ref 26.5–33)
MCHC RBC AUTO-ENTMCNC: 33.5 G/DL (ref 31.5–36.5)
MCV RBC AUTO: 85 FL (ref 78–100)
PLATELET # BLD AUTO: 201 10E3/UL (ref 150–450)
RBC # BLD AUTO: 5.9 10E6/UL (ref 4.4–5.9)
WBC # BLD AUTO: 7.6 10E3/UL (ref 4–11)

## 2024-11-19 PROCEDURE — 85027 COMPLETE CBC AUTOMATED: CPT | Performed by: FAMILY MEDICINE

## 2024-11-19 PROCEDURE — 36415 COLL VENOUS BLD VENIPUNCTURE: CPT | Performed by: FAMILY MEDICINE

## 2024-11-19 RX ORDER — LISINOPRIL AND HYDROCHLOROTHIAZIDE 20; 25 MG/1; MG/1
1 TABLET ORAL DAILY
Qty: 100 TABLET | Refills: 3 | Status: SHIPPED | OUTPATIENT
Start: 2024-11-19

## 2024-11-19 RX ORDER — SERTRALINE HYDROCHLORIDE 100 MG/1
100 TABLET, FILM COATED ORAL DAILY
Qty: 100 TABLET | Refills: 3 | Status: SHIPPED | OUTPATIENT
Start: 2024-11-19

## 2024-11-19 RX ORDER — METOPROLOL SUCCINATE 50 MG/1
50 TABLET, EXTENDED RELEASE ORAL DAILY
Qty: 100 TABLET | Refills: 3 | Status: SHIPPED | OUTPATIENT
Start: 2024-11-19

## 2024-11-19 NOTE — PATIENT INSTRUCTIONS
Patient Education     Artemio,    Great job with your weight loss  We will check laboratory testing as discussed  I sent refills for your medication to the pharmacy  You can consider Dr. Nikki Saeed at [facility] in [facility] JAIR Stout MD      Preventive Care Advice   This is general advice given by our system to help you stay healthy. However, your care team may have specific advice just for you. Please talk to your care team about your preventive care needs.  Nutrition  Eat 5 or more servings of fruits and vegetables each day.  Try wheat bread, brown rice and whole grain pasta (instead of white bread, rice, and pasta).  Get enough calcium and vitamin D. Check the label on foods and aim for 100% of the RDA (recommended daily allowance).  Lifestyle  Exercise at least 150 minutes each week  (30 minutes a day, 5 days a week).  Do muscle strengthening activities 2 days a week. These help control your weight and prevent disease.  No smoking.  Wear sunscreen to prevent skin cancer.  Have a dental exam and cleaning every 6 months.  Yearly exams  See your health care team every year to talk about:  Any changes in your health.  Any medicines your care team has prescribed.  Preventive care, family planning, and ways to prevent chronic diseases.  Shots (vaccines)   HPV shots (up to age 26), if you've never had them before.  Hepatitis B shots (up to age 59), if you've never had them before.  COVID-19 shot: Get this shot when it's due.  Flu shot: Get a flu shot every year.  Tetanus shot: Get a tetanus shot every 10 years.  Pneumococcal, hepatitis A, and RSV shots: Ask your care team if you need these based on your risk.  Shingles shot (for age 50 and up)  General health tests  Diabetes screening:  Starting at age 35, Get screened for diabetes at least every 3 years.  If you are younger than age 35, ask your care team if you should be screened for diabetes.  Cholesterol test: At age 39, start having a cholesterol test  every 5 years, or more often if advised.  Bone density scan (DEXA): At age 50, ask your care team if you should have this scan for osteoporosis (brittle bones).  Hepatitis C: Get tested at least once in your life.  STIs (sexually transmitted infections)  Before age 24: Ask your care team if you should be screened for STIs.  After age 24: Get screened for STIs if you're at risk. You are at risk for STIs (including HIV) if:  You are sexually active with more than one person.  You don't use condoms every time.  You or a partner was diagnosed with a sexually transmitted infection.  If you are at risk for HIV, ask about PrEP medicine to prevent HIV.  Get tested for HIV at least once in your life, whether you are at risk for HIV or not.  Cancer screening tests  Cervical cancer screening: If you have a cervix, begin getting regular cervical cancer screening tests starting at age 21.  Breast cancer scan (mammogram): If you've ever had breasts, begin having regular mammograms starting at age 40. This is a scan to check for breast cancer.  Colon cancer screening: It is important to start screening for colon cancer at age 45.  Have a colonoscopy test every 10 years (or more often if you're at risk) Or, ask your provider about stool tests like a FIT test every year or Cologuard test every 3 years.  To learn more about your testing options, visit:   .  For help making a decision, visit:   https://bit.ly/cm65535.  Prostate cancer screening test: If you have a prostate, ask your care team if a prostate cancer screening test (PSA) at age 55 is right for you.  Lung cancer screening: If you are a current or former smoker ages 50 to 80, ask your care team if ongoing lung cancer screenings are right for you.  For informational purposes only. Not to replace the advice of your health care provider. Copyright   2023 [employer]. All rights reserved. Clinically reviewed by the [facility]. [employer]  698701 - REV 01/24.  Hearing Loss: Care Instructions  Overview     Hearing loss is a sudden or slow decrease in how well you hear. It can range from slight to profound. Permanent hearing loss can occur with aging. It also can happen when you are exposed long-term to loud noise. Examples include listening to loud music, riding motorcycles, or being around other loud machines.  Hearing loss can affect your work and home life. It can make you feel lonely or depressed. You may feel that you have lost your independence. But hearing aids and other devices can help you hear better and feel connected to others.  Follow-up care is a key part of your treatment and safety. Be sure to make and go to all appointments, and call your doctor if you are having problems. It's also a good idea to know your test results and keep a list of the medicines you take.  How can you care for yourself at home?  Avoid loud noises whenever possible. This helps keep your hearing from getting worse.  Always wear hearing protection around loud noises.  Wear a hearing aid as directed.  A professional can help you pick a hearing aid that will work best for you.  You can also get hearing aids over the counter for mild to moderate hearing loss.  Have hearing tests as your doctor suggests. They can show whether your hearing has changed. Your hearing aid may need to be adjusted.  Use other devices as needed. These may include:  Telephone amplifiers and hearing aids that can connect to a television, stereo, radio, or microphone.  Devices that use lights or vibrations. These alert you to the doorbell, a ringing telephone, or a baby monitor.  Television closed-captioning. This shows the words at the bottom of the screen. Most new TVs can do this.  TTY (text telephone). This lets you type messages back and forth on the telephone instead of talking or listening. These devices are also called TDD. When messages are typed on the keyboard, they are sent over the  "phone line to a receiving TTY. The message is shown on a monitor.  Use text messaging, social media, and email if it is hard for you to communicate by telephone.  Try to learn a listening technique called speechreading. It is not lipreading. You pay attention to people's gestures, expressions, posture, and tone of voice. These clues can help you understand what a person is saying. Face the person you are talking to, and have them face you. Make sure the lighting is good. You need to see the other person's face clearly.  Think about counseling if you need help to adjust to your hearing loss.  When should you call for help?  Watch closely for changes in your health, and be sure to contact your doctor if:    You think your hearing is getting worse.     You have new symptoms, such as dizziness or nausea.   Where can you learn more?  Go to https://www.[employer].net/patiented  Enter R798 in the search box to learn more about \"Hearing Loss: Care Instructions.\"  Current as of: September 27, 2023  Content Version: 14.2 2024 [facility] [employer].   Care instructions adapted under license by your healthcare professional. If you have questions about a medical condition or this instruction, always ask your healthcare professional. [employer] disclaims any warranty or liability for your use of this information.       "

## 2024-11-19 NOTE — PROGRESS NOTES
"Preventive Care Visit  [facility]  Krishna Stout MD, Family Medicine  Nov 19, 2024      Assessment & Plan     Encounter for Medicare annual wellness exam    Reviewed vaccines which are up-to-date    Dental concerns    He will follow-up with a dentist regarding problems with his teeth    Essential hypertension    Reviewed multiple blood pressure readings which were completed at home or at other visits  Multiple blood pressure readings are normotensive  Continue lisinopril-hydrochlorothiazide and Metroprolol  Continue to work on dietary lifestyle changes  Check a basic metabolic panel  Continue to monitor blood pressure       - BASIC METABOLIC PANEL; Future  - CBC with platelets; Future  - Hepatic function panel; Future  - Lipid panel reflex to direct LDL Fasting; Future  - metoprolol succinate ER (TOPROL XL) 50 MG 24 hr tablet; Take 1 tablet (50 mg) by mouth daily.  - lisinopril-hydrochlorothiazide (ZESTORETIC) 20-25 MG tablet; Take 1 tablet by mouth daily.  - BASIC METABOLIC PANEL  - CBC with platelets  - Hepatic function panel  - Lipid panel reflex to direct LDL Fasting    Mood disorder (H)    Currently stable  Continue sertraline    - sertraline (ZOLOFT) 100 MG tablet; Take 1 tablet (100 mg) by mouth daily.    Adenomatous polyp of colon, unspecified part of colon    Colonoscopy is up-to-date from July of 2023.  This showed a sessile serrated adenoma.  He is due in July of 2028    Benign prostatic hyperplasia with nocturia    Continue to monitor for symptoms  Consider a trial of tamsulosin in the future    Screening for prostate cancer    Check a PSA        - PSA, screen; Future  - PSA, screen            BMI  Estimated body mass index is 28.23 kg/m  as calculated from the following:    Height as of this encounter: 1.803 m (5' 11\").    Weight as of this encounter: 91.8 kg (202 lb 6.4 oz).       Counseling  Appropriate preventive services were addressed with this patient via " screening, questionnaire, or discussion as appropriate for fall prevention, nutrition, physical activity, Tobacco-use cessation, social engagement, weight loss and cognition.  Checklist reviewing preventive services available has been given to the patient.  Reviewed patient's diet, addressing concerns and/or questions.   The patient was instructed to see the dentist every 6 months.   The patient was provided with written information regarding signs of hearing loss.           Ellen Ulloa is a 69 year old, presenting for the following:  Wellness Visit        11/19/2024    10:23 AM   Additional Questions   Roomed by Cinda POLLARD     Artemio presents for a wellness visit.  He presents for a medication check as well.      He generally has been doing well. He remains active and has worked on his diet. He has been working on his diet and has been able to lose 19 pounds over the past several years and 10 pounds in the last year.     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  He tolerates physical activity without difficulty.    His medical history is notable for hypertension, benign prostatic hypertrophy, impaired fasting glucose levels, anxiety, and adenomatous polyps.     He continues to take lisinopril-hydrochlorothiazide and metoprolol for blood pressure.  He has multiple blood pressure readings and systolic blood pressure readings typically range from 120-131 over diastolic readings ranging from 58-70.  He has been consistent in taking his medications.     He has a history of anxiety and continues to take sertraline.  At one point he tried to wean off this medication but had an increase in anxiety and therefore resumed his typical dose and will stay on that.      He continues experience some urinary frequency and nocturia but this has not been a significant problem.     His last colonoscopy was in July of 2023.  This revealed a tubular adenoma and to be due again in 5 years.     An additional  concern has been his teeth.  He has had dental problems, especially the lower incisors and surrounding teeth.  He may need dental appliances but notes that could be quite expensive.          Health Care Directive  Patient does not have a Health Care Directive: Patient states has Advance Directive and will bring in a copy to clinic.      11/16/2024   General Health   How would you rate your overall physical health? Good   Feel stress (tense, anxious, or unable to sleep) Only a little      (!) STRESS CONCERN      11/16/2024   Nutrition   Diet: Regular (no restrictions)            11/16/2024   Exercise   Days per week of moderate/strenous exercise 4 days   Average minutes spent exercising at this level 40 min            11/16/2024   Social Factors   Frequency of gathering with friends or relatives Once a week   Worry food won't last until get money to buy more No   Food not last or not have enough money for food? No   Do you have housing? (Housing is defined as stable permanent housing and does not include staying ouside in a car, in a tent, in an abandoned building, in an overnight shelter, or couch-surfing.) Yes   Are you worried about losing your housing? No   Lack of transportation? No   Unable to get utilities (heat,electricity)? No            11/16/2024   Fall Risk   Fallen 2 or more times in the past year? No    Trouble with walking or balance? No        Patient-reported          11/16/2024   Activities of Daily Living- Home Safety   Needs help with the following daily activites None of the above   Safety concerns in the home None of the above            11/16/2024   Dental   Dentist two times every year? (!) NO            11/16/2024   Hearing Screening   Hearing concerns? (!) IT'S HARD TO FOLLOW A CONVERSATION IN A NOISY RESTAURANT OR CROWDED ROOM.    (!) TROUBLE UNDERSTANDING SOFT OR WHISPERED SPEECH.       Multiple values from one day are sorted in reverse-chronological order         11/16/2024   Driving Risk  Screening   Patient/family members have concerns about driving No            11/16/2024   General Alertness/Fatigue Screening   Have you been more tired than usual lately? No            11/16/2024   Urinary Incontinence Screening   Bothered by leaking urine in past 6 months No            11/16/2024   TB Screening   Were you born outside of the [place]? Yes            Today's PHQ-2 Score:       11/19/2024     9:34 AM   PHQ-2 ( 1999 [employer])   Q1: Little interest or pleasure in doing things 1    Q2: Feeling down, depressed or hopeless 0    PHQ-2 Score 1    Q1: Little interest or pleasure in doing things Several days   Q2: Feeling down, depressed or hopeless Not at all   PHQ-2 Score 1       Patient-reported           11/16/2024   Substance Use   Alcohol more than 3/day or more than 7/wk No   Do you have a current opioid prescription? No   How severe/bad is pain from 1 to 10? 0/10 (No Pain)   Do you use any other substances recreationally? No        Social History     Tobacco Use    Smoking status: Former    Smokeless tobacco: Never   Vaping Use    Vaping status: Never Used       Last PSA:   Prostate Specific Antigen Screen   Date Value Ref Range Status   11/20/2023 0.62 0.00 - 4.50 ng/mL Final   10/23/2020 0.9 0.0 - 4.5 ng/mL Final     ASCVD Risk   The 10-year ASCVD risk score (Linette ZIMMER, et al., 2019) is: 14.9%    Values used to calculate the score:      Age: 69 years      Sex: Male      Is Non- : No      Diabetic: No      Tobacco smoker: No      Systolic Blood Pressure: 149 mmHg      Is BP treated: Yes      HDL Cholesterol: 65 mg/dL      Total Cholesterol: 131 mg/dL            Reviewed and updated as needed this visit by Provider                    History reviewed. No pertinent past medical history.  Past Surgical History:   Procedure Laterality Date    HC REMOVAL OF TONSILS,<11 Y/O      Description: Tonsillectomy;  Recorded: 03/11/2008;    KS LAP,HERNIA REPAIR PROC,UNLIST       "Description: Laparoscopy Repair Of Hernia;  Recorded: 03/19/2013;    ZZHC REPAIR UMBILICAL AYAN,<6Y/O,REDUC      Description: Umbilical Hernia Repair;  Recorded: 07/09/2013;     Current providers sharing in care for this patient include:  Patient Care Team:  Krishna Stout MD as PCP - General (Family Practice)  Krishna Stout MD as Assigned PCP    The following health maintenance items are reviewed in Epic and correct as of today:  Health Maintenance   Topic Date Due    AORTIC ANEURYSM SCREENING (SYSTEM ASSIGNED)  Never done    MEDICARE ANNUAL WELLNESS VISIT  11/20/2024    BMP  11/20/2024    ANNUAL REVIEW OF HM ORDERS  11/20/2024    FALL RISK ASSESSMENT  11/19/2025    GLUCOSE  11/20/2026    COLORECTAL CANCER SCREENING  07/12/2028    LIPID  11/20/2028    ADVANCE CARE PLANNING  11/26/2028    DTAP/TDAP/TD IMMUNIZATION (4 - Td or Tdap) 12/17/2033    HEPATITIS C SCREENING  Completed    PHQ-2 (once per calendar year)  Completed    INFLUENZA VACCINE  Completed    Pneumococcal Vaccine: 65+ Years  Completed    ZOSTER IMMUNIZATION  Completed    RSV VACCINE  Completed    COVID-19 Vaccine  Completed    HPV IMMUNIZATION  Aged Out    MENINGITIS IMMUNIZATION  Aged Out    RSV MONOCLONAL ANTIBODY  Aged Out    LUNG CANCER SCREENING  Discontinued         Review of Systems  Constitutional, HEENT, cardiovascular, pulmonary, gi and gu systems are negative, except as otherwise noted.     Objective    Exam  BP (!) 149/77 (BP Location: Left arm, Patient Position: Sitting, Cuff Size: Adult Regular)   Pulse 60   Temp 97.7  F (36.5  C) (Oral)   Resp 16   Ht 1.803 m (5' 11\")   Wt 91.8 kg (202 lb 6.4 oz)   SpO2 97%   BMI 28.23 kg/m     Estimated body mass index is 28.23 kg/m  as calculated from the following:    Height as of this encounter: 1.803 m (5' 11\").    Weight as of this encounter: 91.8 kg (202 lb 6.4 oz).    Physical Exam  GENERAL: alert and no distress  EYES: Eyes grossly normal to inspection, PERRL and conjunctivae " and sclerae normal  HENT: ear canals and TM's normal, nose and mouth without ulcers or lesions  NECK: no adenopathy, no asymmetry, masses, or scars  RESP: lungs clear to auscultation - no rales, rhonchi or wheezes  CV: regular rate and rhythm, normal S1 S2, no S3 or S4, no murmur, click or rub, no peripheral edema  MS: no gross musculoskeletal defects noted, no edema  SKIN: no suspicious lesions or rashes  NEURO: Normal strength and tone, mentation intact and speech normal  PSYCH: mentation appears normal, affect normal/bright         11/19/2024   Mini Cog   Clock Draw Score 2 Normal   3 Item Recall 3 objects recalled   Mini Cog Total Score 5                 Signed Electronically by: Krishna Stout MD

## 2024-11-20 LAB
ALBUMIN SERPL BCG-MCNC: 4.3 G/DL (ref 3.5–5.2)
ALP SERPL-CCNC: 69 U/L (ref 40–150)
ALT SERPL W P-5'-P-CCNC: 10 U/L (ref 0–70)
ANION GAP SERPL CALCULATED.3IONS-SCNC: 13 MMOL/L (ref 7–15)
AST SERPL W P-5'-P-CCNC: 21 U/L (ref 0–45)
BILIRUB DIRECT SERPL-MCNC: <0.2 MG/DL (ref 0–0.3)
BILIRUB SERPL-MCNC: 0.6 MG/DL
BUN SERPL-MCNC: 15.3 MG/DL (ref 8–23)
CALCIUM SERPL-MCNC: 9.9 MG/DL (ref 8.8–10.4)
CHLORIDE SERPL-SCNC: 100 MMOL/L (ref 98–107)
CHOLEST SERPL-MCNC: 153 MG/DL
CREAT SERPL-MCNC: 1.03 MG/DL (ref 0.67–1.17)
EGFRCR SERPLBLD CKD-EPI 2021: 80 ML/MIN/1.73M2
FASTING STATUS PATIENT QL REPORTED: YES
FASTING STATUS PATIENT QL REPORTED: YES
GLUCOSE SERPL-MCNC: 92 MG/DL (ref 70–99)
HCO3 SERPL-SCNC: 26 MMOL/L (ref 22–29)
HDLC SERPL-MCNC: 38 MG/DL
LDLC SERPL CALC-MCNC: 80 MG/DL
NONHDLC SERPL-MCNC: 115 MG/DL
POTASSIUM SERPL-SCNC: 4.1 MMOL/L (ref 3.4–5.3)
PROT SERPL-MCNC: 7.1 G/DL (ref 6.4–8.3)
PSA SERPL DL<=0.01 NG/ML-MCNC: 0.52 NG/ML (ref 0–4.5)
SODIUM SERPL-SCNC: 139 MMOL/L (ref 135–145)
TRIGL SERPL-MCNC: 177 MG/DL